# Patient Record
Sex: MALE | Race: WHITE
[De-identification: names, ages, dates, MRNs, and addresses within clinical notes are randomized per-mention and may not be internally consistent; named-entity substitution may affect disease eponyms.]

---

## 2017-12-12 ENCOUNTER — HOSPITAL ENCOUNTER (INPATIENT)
Dept: HOSPITAL 92 - SURG A | Age: 82
LOS: 10 days | Discharge: NURSING, REHAB OR LONG-TERM CARE | DRG: 468 | End: 2017-12-22
Attending: ORTHOPAEDIC SURGERY | Admitting: ORTHOPAEDIC SURGERY
Payer: MEDICARE

## 2017-12-12 ENCOUNTER — HOSPITAL ENCOUNTER (OUTPATIENT)
Dept: HOSPITAL 92 - LABBT | Age: 82
Discharge: HOME | End: 2017-12-12
Attending: ORTHOPAEDIC SURGERY
Payer: MEDICARE

## 2017-12-12 VITALS — BODY MASS INDEX: 35.9 KG/M2

## 2017-12-12 DIAGNOSIS — I10: ICD-10-CM

## 2017-12-12 DIAGNOSIS — E78.5: ICD-10-CM

## 2017-12-12 DIAGNOSIS — I49.9: ICD-10-CM

## 2017-12-12 DIAGNOSIS — K21.9: ICD-10-CM

## 2017-12-12 DIAGNOSIS — Z90.49: ICD-10-CM

## 2017-12-12 DIAGNOSIS — Z85.828: ICD-10-CM

## 2017-12-12 DIAGNOSIS — Z95.0: ICD-10-CM

## 2017-12-12 DIAGNOSIS — Z01.818: Primary | ICD-10-CM

## 2017-12-12 DIAGNOSIS — T84.59XA: ICD-10-CM

## 2017-12-12 DIAGNOSIS — K27.9: ICD-10-CM

## 2017-12-12 DIAGNOSIS — S83.005A: ICD-10-CM

## 2017-12-12 DIAGNOSIS — Z96.651: ICD-10-CM

## 2017-12-12 DIAGNOSIS — Z98.42: ICD-10-CM

## 2017-12-12 DIAGNOSIS — Z98.41: ICD-10-CM

## 2017-12-12 DIAGNOSIS — T84.54XA: Primary | ICD-10-CM

## 2017-12-12 DIAGNOSIS — T84.093A: ICD-10-CM

## 2017-12-12 DIAGNOSIS — E11.618: ICD-10-CM

## 2017-12-12 DIAGNOSIS — N40.0: ICD-10-CM

## 2017-12-12 DIAGNOSIS — G47.33: ICD-10-CM

## 2017-12-12 DIAGNOSIS — Z86.718: ICD-10-CM

## 2017-12-12 LAB
ANION GAP SERPL CALC-SCNC: 14 MMOL/L (ref 10–20)
APTT PPP: 33.3 SEC (ref 22.9–36.1)
BACTERIA UR QL AUTO: (no result) HPF
BASOPHILS # BLD AUTO: 0.1 THOU/UL (ref 0–0.2)
BASOPHILS NFR BLD AUTO: 0.9 % (ref 0–1)
BUN SERPL-MCNC: 25 MG/DL (ref 8.4–25.7)
CALCIUM SERPL-MCNC: 9.4 MG/DL (ref 7.8–10.44)
CHLORIDE SERPL-SCNC: 110 MMOL/L (ref 98–107)
CO2 SERPL-SCNC: 22 MMOL/L (ref 23–31)
CREAT CL PREDICTED SERPL C-G-VRATE: 0 ML/MIN (ref 70–130)
EOSINOPHIL # BLD AUTO: 0.6 THOU/UL (ref 0–0.7)
EOSINOPHIL NFR BLD AUTO: 6.1 % (ref 0–10)
HCT VFR BLD CALC: 40.9 % (ref 42–52)
HYALINE CASTS #/AREA URNS LPF: (no result) LPF
LYMPHOCYTES # BLD: 2.9 THOU/UL (ref 1.2–3.4)
LYMPHOCYTES NFR BLD AUTO: 28 % (ref 21–51)
MONOCYTES # BLD AUTO: 1.1 THOU/UL (ref 0.11–0.59)
MONOCYTES NFR BLD AUTO: 10.4 % (ref 0–10)
NEUTROPHILS # BLD AUTO: 5.7 THOU/UL (ref 1.4–6.5)
PROTHROMBIN TIME: 14.4 SEC (ref 12–14.7)
RBC # BLD AUTO: 4.48 MILL/UL (ref 4.7–6.1)
WBC # BLD AUTO: 10.5 THOU/UL (ref 4.8–10.8)

## 2017-12-12 PROCEDURE — 85027 COMPLETE CBC AUTOMATED: CPT

## 2017-12-12 PROCEDURE — 82550 ASSAY OF CK (CPK): CPT

## 2017-12-12 PROCEDURE — 85025 COMPLETE CBC W/AUTO DIFF WBC: CPT

## 2017-12-12 PROCEDURE — 89060 EXAM SYNOVIAL FLUID CRYSTALS: CPT

## 2017-12-12 PROCEDURE — 87081 CULTURE SCREEN ONLY: CPT

## 2017-12-12 PROCEDURE — 85730 THROMBOPLASTIN TIME PARTIAL: CPT

## 2017-12-12 PROCEDURE — 80048 BASIC METABOLIC PNL TOTAL CA: CPT

## 2017-12-12 PROCEDURE — 86901 BLOOD TYPING SEROLOGIC RH(D): CPT

## 2017-12-12 PROCEDURE — 87070 CULTURE OTHR SPECIMN AEROBIC: CPT

## 2017-12-12 PROCEDURE — C1751 CATH, INF, PER/CENT/MIDLINE: HCPCS

## 2017-12-12 PROCEDURE — 36415 COLL VENOUS BLD VENIPUNCTURE: CPT

## 2017-12-12 PROCEDURE — 87205 SMEAR GRAM STAIN: CPT

## 2017-12-12 PROCEDURE — 83735 ASSAY OF MAGNESIUM: CPT

## 2017-12-12 PROCEDURE — 86140 C-REACTIVE PROTEIN: CPT

## 2017-12-12 PROCEDURE — C1776 JOINT DEVICE (IMPLANTABLE): HCPCS

## 2017-12-12 PROCEDURE — 88305 TISSUE EXAM BY PATHOLOGIST: CPT

## 2017-12-12 PROCEDURE — 36416 COLLJ CAPILLARY BLOOD SPEC: CPT

## 2017-12-12 PROCEDURE — 85610 PROTHROMBIN TIME: CPT

## 2017-12-12 PROCEDURE — C1713 ANCHOR/SCREW BN/BN,TIS/BN: HCPCS

## 2017-12-12 PROCEDURE — 86850 RBC ANTIBODY SCREEN: CPT

## 2017-12-12 PROCEDURE — S0020 INJECTION, BUPIVICAINE HYDRO: HCPCS

## 2017-12-12 PROCEDURE — 81001 URINALYSIS AUTO W/SCOPE: CPT

## 2017-12-12 PROCEDURE — 87077 CULTURE AEROBIC IDENTIFY: CPT

## 2017-12-12 PROCEDURE — 36569 INSJ PICC 5 YR+ W/O IMAGING: CPT

## 2017-12-12 PROCEDURE — 71020: CPT

## 2017-12-12 PROCEDURE — 86900 BLOOD TYPING SEROLOGIC ABO: CPT

## 2017-12-12 PROCEDURE — 80202 ASSAY OF VANCOMYCIN: CPT

## 2017-12-12 NOTE — RAD
HISTORY: 

Preoperative chest radiograph

 

TWO VIEWS CHEST:

12/12/17

 

PA and lateral views of the chest is obtained on 12/12/17.

 

Comparison made to previous exam of 6/5/06.

 

Two views chest demonstrates a dual lead intracardiac pacing device. Cardiomegaly is seen. Calcificat
ion of the aorta is noted. No evidence of effusions, pneumonia, or pneumothorax seen.

 

IMPRESSION:  

Cardiomegaly.

 

 

 

POS: [facility]

## 2017-12-15 PROCEDURE — 0SHD08Z INSERTION OF SPACER INTO LEFT KNEE JOINT, OPEN APPROACH: ICD-10-PCS | Performed by: ORTHOPAEDIC SURGERY

## 2017-12-15 PROCEDURE — 0SBD0ZZ EXCISION OF LEFT KNEE JOINT, OPEN APPROACH: ICD-10-PCS | Performed by: ORTHOPAEDIC SURGERY

## 2017-12-15 PROCEDURE — 0SWD0JZ REVISION OF SYNTHETIC SUBSTITUTE IN LEFT KNEE JOINT, OPEN APPROACH: ICD-10-PCS | Performed by: ORTHOPAEDIC SURGERY

## 2017-12-15 RX ADMIN — DOCUSATE SODIUM 50 MG AND SENNOSIDES 8.6 MG SCH TAB: 8.6; 5 TABLET, FILM COATED ORAL at 21:40

## 2017-12-15 RX ADMIN — VANCOMYCIN HYDROCHLORIDE SCH MLS: 1 INJECTION, POWDER, LYOPHILIZED, FOR SOLUTION INTRAVENOUS at 21:41

## 2017-12-15 RX ADMIN — AZELASTINE HYDROCHLORIDE SCH SPR: 137 SPRAY, METERED NASAL at 21:42

## 2017-12-15 RX ADMIN — CEFTRIAXONE SCH MLS: 2 INJECTION, POWDER, FOR SOLUTION INTRAMUSCULAR; INTRAVENOUS at 15:43

## 2017-12-16 LAB
ANION GAP SERPL CALC-SCNC: 8 MMOL/L (ref 10–20)
BUN SERPL-MCNC: 21 MG/DL (ref 8.4–25.7)
CALCIUM SERPL-MCNC: 8 MG/DL (ref 7.8–10.44)
CHLORIDE SERPL-SCNC: 111 MMOL/L (ref 98–107)
CO2 SERPL-SCNC: 25 MMOL/L (ref 23–31)
CREAT CL PREDICTED SERPL C-G-VRATE: 85 ML/MIN (ref 70–130)
GLUCOSE SERPL-MCNC: 237 MG/DL (ref 83–110)
HGB BLD-MCNC: 11 G/DL (ref 14–18)
MAGNESIUM SERPL-MCNC: 1.7 MG/DL (ref 1.6–2.6)
MCH RBC QN AUTO: 29.8 PG (ref 27–31)
MCV RBC AUTO: 92.4 FL (ref 80–94)
PLATELET # BLD AUTO: 118 THOU/UL (ref 130–400)
POTASSIUM SERPL-SCNC: 4.3 MMOL/L (ref 3.5–5.1)
RBC # BLD AUTO: 3.68 MILL/UL (ref 4.7–6.1)
SODIUM SERPL-SCNC: 140 MMOL/L (ref 136–145)
VANCOMYCIN TROUGH SERPL-MCNC: 17.8 UG/ML
WBC # BLD AUTO: 11.7 THOU/UL (ref 4.8–10.8)

## 2017-12-16 RX ADMIN — VANCOMYCIN HYDROCHLORIDE SCH MLS: 1 INJECTION, POWDER, LYOPHILIZED, FOR SOLUTION INTRAVENOUS at 21:03

## 2017-12-16 RX ADMIN — AZELASTINE HYDROCHLORIDE SCH SPR: 137 SPRAY, METERED NASAL at 20:31

## 2017-12-16 RX ADMIN — DOCUSATE SODIUM 50 MG AND SENNOSIDES 8.6 MG SCH TAB: 8.6; 5 TABLET, FILM COATED ORAL at 08:42

## 2017-12-16 RX ADMIN — ALOGLIPTIN SCH MG: 25 TABLET, FILM COATED ORAL at 08:42

## 2017-12-16 RX ADMIN — CEFTRIAXONE SCH MLS: 2 INJECTION, POWDER, FOR SOLUTION INTRAMUSCULAR; INTRAVENOUS at 12:37

## 2017-12-16 RX ADMIN — DOCUSATE SODIUM 50 MG AND SENNOSIDES 8.6 MG SCH TAB: 8.6; 5 TABLET, FILM COATED ORAL at 20:30

## 2017-12-16 RX ADMIN — ASPIRIN SCH MG: 81 TABLET ORAL at 08:43

## 2017-12-16 RX ADMIN — HYDROCODONE BITARTRATE AND ACETAMINOPHEN PRN TAB: 10; 325 TABLET ORAL at 17:03

## 2017-12-16 RX ADMIN — MULTIPLE VITAMINS W/ MINERALS TAB SCH TAB: TAB at 08:43

## 2017-12-16 RX ADMIN — VANCOMYCIN HYDROCHLORIDE SCH MLS: 1 INJECTION, POWDER, LYOPHILIZED, FOR SOLUTION INTRAVENOUS at 08:38

## 2017-12-16 RX ADMIN — AZELASTINE HYDROCHLORIDE SCH SPR: 137 SPRAY, METERED NASAL at 12:39

## 2017-12-16 RX ADMIN — Medication SCH TAB: at 08:43

## 2017-12-16 NOTE — CON
DATE OF CONSULTATION:  12/16/2017

 

REASON FOR CONSULTATION:  Left total knee replacement infection.

 

HISTORY OF PRESENT ILLNESS:  An 82-year-old with history of type 2 diabetes, Farnk's esophagus, atr
ial fibrillation, sick sinus syndrome, pacemaker, and a prior left TKR implant, who developed progres
sively worsening pain in the left implant site, and eventually was evaluated by Dr. Roberts and had re
moval of the left implant and had a functional spacer placed yesterday.  The patient currently denies
 any headaches, visual symptoms, sore throat, odynophagia, dysphagia.  No cough or sputum production 
or chest pain.  Moderate pain in the left side.  No back pain.  No other joint symptoms.

 

PAST MEDICAL HISTORY:  Prior GI bleed, type 2 diabetes, Frank's esophagus, atrial fibrillation, DVT
, UTI in the past secondary to vancomycin-resistant Enterococcus.

 

PAST SURGICAL HISTORY:  Includes appendectomy, tonsillectomy, left knee replacement, and now the jacklyn
cele of the implant, wound VAC placement, duodenostomy.

 

ALLERGIES:  None.

 

FAMILY HISTORY:  Noncontributory.

 

SOCIAL HISTORY:  He used to work for [employer], he is retired, lives in [place], .  Never a smo
ker.

 

CURRENT MEDICATIONS:  Alogliptin, Norco, Ecotrin, azelastine, ceftriaxone, fentanyl, insulin, levothy
roxine, Zofran, Phenergan, vancomycin, Ambien.

 

PHYSICAL EXAMINATION:

VITAL SIGNS:  The patient has been afebrile throughout the hospital stay.  Blood pressure 119/69, pul
se 62, respirations 18, O2 sat 94%.

SKIN EXAM:  The patient has a left implant site with dressing, which was not removed.  Peripheral IV 
access.  No Goins catheter.  No lymphadenopathy.

HEENT:  Ocular movements are conjugate.  Oral cavity still with quite a few teeth in place with some 
decay.  No gum disease.

NECK:  Supple, no jugular venous distention or carotid bruits.

LUNGS:  Clear to auscultation and percussion.

HEART:  S1, S2, regular rate.  No S3 or S4.

ABDOMEN:  Soft, not distended, or tender.  No ascites.  No bladder distention.

EXTREMITIES:  No other joint inflammatory activity noted.  Pulses are 1+ in dorsalis pedis.  Plantar 
responses are flexor.  No clonus.

NEUROLOGIC:  Cognitive function appears to be intact.

 

LABORATORY DATA:  White cell count 11.7, hemoglobin 11, MCV 92, platelets 119, and sodium 140, creati
nine 1.25, which is a little bit higher than his baseline in 04/2016 when it was 0.98.  Microbiology 
with gram-positive cocci in clusters identified in one sample, and the other sample from the knee, th
ere is early growth, but not yet identified.  Pathology of surgical specimen pending.

 

ASSESSMENT:  Type 2 diabetes with prior implants in left knee with infection, status post removal of 
the implant and placement of a functional spacer.  Waiting on culture results.

 

DISCUSSION:  Patient will have a PICC line inserted and plan treatment for 42 days with antimicrobial
 to be decided once we have the final identification of the organism.  Since this is a functional imp
lant and will remain in place, plan to continue treatment for 3 months with oral regimen, again to be
 decided by the susceptibility studies being performed in the organisms isolated from the sample.  PI
CC line placement and we have discussed potential adverse reactions from the PICC line as well as the
 antimicrobials including skin rash, diarrhea, kidney, liver, and bone marrow toxicity.  The patient 
and wife understood and agreed with management and recommendations.

## 2017-12-16 NOTE — PRG
DATE OF SERVICE:  12/16/2017

 

SUBJECTIVE:  Today is postop day #1 after resection and placement of a new total knee, basically a 1-
stage revision with cemented implants.  Today, he is doing quite well.  He has got excellent pain rel
ief with his block.  He has only a scant drainage today.  As ordered, a wound VAC has been placed.  M
icrobiology shows gram-positive cocci in clusters and cultures are still pending at this time.

 

ASSESSMENT:  Chronically infected left total knee.

 

PLAN:  At this time is as follows.  A wound VAC until the sinus tract closes.  IV antibiotics.  Discu
ssed with Dr. Lin.  He is on vancomycin and Zosyn.  Also, patient will be switched to a consistent 
carbohydrate diet.  Discussed with Dr. Verduzco today.  A PICC line was ordered by myself, and 2 views 
of the knee will be obtained for routine documentation purposes while in the department for his PICC 
line.

## 2017-12-16 NOTE — PDOC.PN
- Subjective


Encounter Start Date: 12/16/17


Encounter Start Time: 09:40





PT sen earlier on rounds.  No overnight problems, states he hasnt had anything 

to eat.  No F/C, no N/V/DC, no sweats or chills.  no CP or SOB, slept with his 

CPAP last night





10 point ROS performed and neg for all systems except as per HPI.





Wife at bedside, questions answers.





- Objective


Vital Signs & Weight: 


 Vital Signs (12 hours)











  Temp Pulse Resp BP Pulse Ox


 


 12/16/17 07:29  98.4 F  60  19  


 


 12/16/17 07:20  98 F  64  18  124/66  93 L


 


 12/16/17 04:00  98.4 F  60  19  109/63  92 L


 


 12/16/17 00:00  97.8 F  63  18  131/64  94 L








 Weight











Weight                         287 lb














I&O: 


 











 12/15/17 12/16/17 12/17/17





 06:59 06:59 06:59


 


Intake Total  1440 


 


Output Total  750 


 


Balance  690 











Result Diagrams: 


 12/16/17 04:59





 12/16/17 04:59


Additional Labs: 


 Accuchecks











  12/16/17 12/15/17





  05:13 20:14


 


POC Glucose  219 H  263 H














Phys Exam





- Physical Examination


Constitutional: NAD


HEENT: PERRLA, moist MMs, sclera anicteric, oral pharynx no lesions


Neck: no nodes, no JVD, supple, full ROM


Respiratory: no wheezing, no rales, no rhonchi, clear to auscultation bilateral


Cardiovascular: RRR, no significant murmur, no rub


Gastrointestinal: soft, non-tender, no distention, positive bowel sounds


Musculoskeletal: pulses present, edema present


Neurological: non-focal, normal sensation, moves all 4 limbs


Lymphatic: no nodes


Psychiatric: normal affect, A&O x 3


Skin: no rash, normal turgor, cap refill <2 seconds





Dx/Plan


(1) HTN (hypertension)


Code(s): I10 - ESSENTIAL (PRIMARY) HYPERTENSION   Status: Chronic   


Qualifiers: 


   Hypertension type: essential hypertension   Qualified Code(s): I10 - 

Essential (primary) hypertension   


Comment: pt states he had before, BP normal now, not on Rx.  CCM and monitor   





(2) PUD (peptic ulcer disease)


Code(s): K27.9 - PEPTIC ULC, SITE UNSP, UNSP AS AC OR CHR, W/O HEMOR OR PERF   

Status: Chronic   Comment: massive bleed requiring surgical oversew in past.  

GI prophylaxis   





(3) DM2 (diabetes mellitus, type 2)


Status: Chronic   


Qualifiers: 


   Diabetes mellitus complication status: without complication   Diabetes 

mellitus long term insulin use: without long term use   Qualified Code(s): 

E11.9 - Type 2 diabetes mellitus without complications   





(4) Infection of prosthetic knee joint


Code(s): T84.59XA - INFECT/INFLM REACTION DUE TO OTH INTERNAL JOINT PROSTH, INIT

; Z96.659 - PRESENCE OF UNSPECIFIED ARTIFICIAL KNEE JOINT   Status: Acute   


Qualifiers: 


   Encounter type: initial encounter   Qualified Code(s): T84.59XA - Infection 

and inflammatory reaction due to other internal joint prosthesis, initial 

encounter; Z96.659 - Presence of unspecified artificial knee joint; Z96.659 - 

Presence of unspecified artificial knee joint   


Comment: s/p 1 stage revision by Dr Roberts.  Tissue cx, fluid cx, and swab cx 

sent, all neg overnight.  Only fluid Grams Stain positive, revelaing GPC in 

clusters.  Vanc   





(5) PRISCA (obstructive sleep apnea)


Code(s): G47.33 - OBSTRUCTIVE SLEEP APNEA (ADULT) (PEDIATRIC)   Status: Chronic

   Comment: home CPAP   





(6) Knee arthropathy


Code(s): M12.9 - ARTHROPATHY, UNSPECIFIED   Status: Acute   





- Plan


cont current plan of care, plan discussed w/ family, continue antibiotics, PT/OT

, out of bed/ambulate





* .

## 2017-12-16 NOTE — PRG
DATE OF SERVICE:  12/16/2017

 

SUBJECTIVE:  Coy is an 82-year-old male, who is postoperative day #1 from a single-stage revision
 left total knee arthroplasty due to infection.  He is doing relatively well.  He has very little in 
the way of complaints.  No definitive cultures have returned yet.  There is a question of gram-positi
ve cocci on microscopy and Gram stain.  He has been relatively stable overnight.

 

OBJECTIVE:

VITAL SIGNS:  Temperature 98.4, pulse 60, respiratory rate is 19, blood pressure is 124/66.

GENERAL:  He is alert and oriented to person, place, time, and situation, and appropriate and respons
annel to examiner.  Pain appears to be under good control.

EXTREMITIES:  He is neurovascularly intact in left lower extremity.  No strike through is noted.

 

LABORATORY EVALUATION:  White blood cell count was 11.7, hemoglobin 11.0, hematocrit 34.0.  Cultures 
are still pending definitively.

 

ASSESSMENT:  An 82-year-old male postoperative day revision single-stage left total knee arthroplasty
 due to an infection.

 

PLAN:

1.  Continue current management.

2.  Dr. Lin' evaluation is pending.

3.  Wound care will see the patient later this afternoon for a VAC placement.

4.  Follow cultures, treat appropriately.

## 2017-12-16 NOTE — CON
DATE OF CONSULTATION:  12/15/2017

 

INITIAL INPATIENT CONSULT NOTE

 

TIME OF VISIT:  1500 hours.

 

REQUESTING PHYSICIAN:  Dyllan Roberts M.D.

 

REASON FOR CONSULTATION:  Medical management status post revision of left total knee arthroplasty due
 to infection.

 

Patient has multiple comorbidities including sleep apnea, hypertension, atrial arrhythmias, diabetes,
 GERD, and BPH.

 

Patient had a knee replacement initially back in 04/2014.  He has had issues with skin breakdown and 
drainage from the same knee and conservative management.  He is also referred to Dr. Roberts, and was 
noted to have a skin breakdown and drainage from the wound.

 

Cultures were not obtained prior to admission.  The patient was admitted postoperative from left knee
 hardware removal and replacement and stage I procedure and cultures were sent from the fluid, tissue
, and swab at the time of surgery.

 

Postoperatively, he is doing well.  Denies any fevers or chills, chest pain or shortness of breath, n
o nausea or vomiting, no diarrhea or constipation.

 

PAST MEDICAL HISTORY:

1.  Obstructive sleep apnea on CPAP, he did bring his own machine.

2.  Hypertension, is under control.

3.  Arrhythmias status post ablation x1.  He sees Dr. Salcedo regularly.

4.  Diabetes mellitus type 2.

5.  GERD/peptic ulcer disease with a massive GI bleed back in 2004.  He states he required 17 units o
f blood transfusion.

6.  BPH.

 

PAST SURGICAL HISTORY:  Includes bilateral cataracts, skin cancer removal, heart ablation, laparoscop
ic cholecystectomy, intraoperative cholangiogram back in 04/2016, ulnar nerve transposition in 05/201
7, TKA 04/2014.

 

HOME MEDICATIONS:

1.  Amoxicillin 2000 mg p.o. prior to dental procedures.

2.  Aspirin 81 mg daily.

3.  Azelastine fluticasone 20 mg spray 1 spray each naris daily.

4.  Calcium plus D3 1500/250 1 tablet daily.

5.  Plavix 75 mg daily.

6.  Cyanocobalamin 5000 mcg daily.

7.  Glucosamine chondroitin 2 tablets daily.

8.  Dexlansoprazole 60 mg p.o. at bedtime.

9.  Proscar 5 mg p.o. daily.

10.  Levothyroxine 75 mcg daily.

11.  Centrum Silver 1 tablet daily.

12.  Oxybutynin 2 tablets p.o. q.p.m.

13.  Crestor 20 mg p.o. at bedtime.

14.  Januvia 50 mg p.o. q.a.m.

15.  Flomax 0.4 mg p.o. at bedtime.

 

ALLERGIES:  NKDA.

 

FAMILY HISTORY:  Significant for diabetes, hypertension, and strokes.

 

SOCIAL HISTORY:  Negative for habits x3.  He is .  His wife accompanies him.

 

REVIEW OF SYSTEMS:  A 10-point review of systems was performed and was negative for all other systems
 except as stated per HPI.

 

PHYSICAL EXAMINATION:

VITAL SIGNS:  Temperature current 97.5, pulse 68, blood pressure 110/63, respiratory rate 20, satting
 93% on 2 liters nasal cannula.

GENERAL:  He is awake.  He is alert.  He is oriented x3.  He is an obese white male, appears to be in
 no acute distress.

HEENT:  Normocephalic, atraumatic.  Pupils are equal and reactive bilaterally.  Mucous membranes are 
moist.  There is no visible lesion and no thrush.

NECK:  Supple, without lymphadenopathy, JVD or thyromegaly.

LUNGS:  Clear to auscultation bilaterally without wheezes, rales or rhonchi.  He has no prolonged exp
iratory phase.  He has good air movement.  Symmetrical chest excursion.

CARDIOVASCULAR:  Normal S1 and S2.  No S3 or S4.  He has a regular rhythm with a normal rate.  He has
 no murmurs.

ABDOMEN:  Obese, it is nontender, and nondistended.  He has good bowel sounds present.  There is no r
ebound, rigidity or guarding.

EXTREMITIES:  There is no cyanosis or clubbing.  There is trace bilateral lower extremity edema, more
 than the left than the right.  He has a postop dressing present over his left knee and that is clean
, dry, and intact.

SKIN:  Otherwise, warm, moist, and well perfused.  He has no other rashes and no lesions.

NEUROLOGIC:  Cranial nerves II-XII grossly intact.  He has no focal deficits, normal speech, and 5/5 
strength.

MUSCULOSKELETAL:  Other than that left knee shows joints to be normal to inspection.  He has no infla
mmation or redness and no palpable effusions.

 

LABORATORY DATA:  On 12/12/2017 he had a basic metabolic profile that was normal.  Creatinine 1.21, p
otassium 4.5, glucose 146 and calcium 9.4.  Urinalysis that did show greater than 50 white blood cell
s, 1+ bacteria and moderate leukocyte esterase.  INR 1.1.  CBC showed white count of 10.5, hemoglobin
 13.1, hematocrit of 40.7 and platelets 153,000.

 

MICROBIOLOGIC DATA:  He had knee tissue culture sent with Gram stain that showed white cells and no o
rganisms, fluid showed rare gram positive cocci in clusters and white blood cells, swab showed no whi
te blood cells, no bacteria.

 

Cultures have been sent.

 

ASSESSMENT AND PLAN:

1.  Infected left total knee arthroplasty, late onset and patient had symptoms longer than 2 weeks.  
The patient had a 1 staged procedure as he was not a good candidate for two stage.  This does tend to
 be truly infected, at least 6-8 weeks of IV antibiotics.  C-reactive protein is just over 1, sed rat
e was not done and we will add.  Would treat for a minimum of 6 weeks.  If sed rate and CRP are henny
l by then and then convert to p.o. antibiotics, otherwise continue IV antibiotics to complete an 8-we
ek course before switching to p.o.

2.  After IV is complete, we will treat for 3-6 months with oral antibiotics depending on the identif
ication and susceptibilities.

3.  Status post 1 stage revision.  Postop care per Orthopedics including deep venous thrombosis proph
ylaxis.

4.  History of obstructive sleep apnea:  Continue home continuous positive airway pressure.

5.  History of hypertension:  The patient states it is controlled and off medications.  We will monit
or here in the hospital.

6.  History of atrial arrhythmia followed by Dr. Salcedo.  His preop workup revealed nothing abnormal.


7.  Diabetes mellitus type 2, glucose 146 on prescreening labs:  We will watch his sugars here.

8.  Gastroesophageal reflux disease/peptic ulcer disease.  We will continue his dexlansoprazole for g
astrointestinal prophylaxis.

9.  History of BPH on Proscar and Flomax, which we will continue.

10.  Hyperlipidemia on Crestor, which can continue.

11.  Long term monitoring of antibiotics:  If he again does start to have an infection, will need gwen
g-term IV antibiotics.  We will need weekly CBC, CMP, ESR, and CRP and vancomycin if it turns out to 
need vancomycin for treatment.

## 2017-12-17 LAB
HGB BLD-MCNC: 11 G/DL (ref 14–18)
MCH RBC QN AUTO: 30.1 PG (ref 27–31)
MCV RBC AUTO: 93.1 FL (ref 80–94)
PLATELET # BLD AUTO: 103 THOU/UL (ref 130–400)
RBC # BLD AUTO: 3.64 MILL/UL (ref 4.7–6.1)
WBC # BLD AUTO: 12.6 THOU/UL (ref 4.8–10.8)

## 2017-12-17 RX ADMIN — VANCOMYCIN HYDROCHLORIDE SCH MLS: 1 INJECTION, POWDER, LYOPHILIZED, FOR SOLUTION INTRAVENOUS at 08:58

## 2017-12-17 RX ADMIN — DOCUSATE SODIUM 50 MG AND SENNOSIDES 8.6 MG SCH TAB: 8.6; 5 TABLET, FILM COATED ORAL at 09:03

## 2017-12-17 RX ADMIN — Medication PRN ML: at 21:03

## 2017-12-17 RX ADMIN — INSULIN LISPRO PRN UNIT: 100 INJECTION, SOLUTION INTRAVENOUS; SUBCUTANEOUS at 21:25

## 2017-12-17 RX ADMIN — Medication SCH TAB: at 09:02

## 2017-12-17 RX ADMIN — VANCOMYCIN HYDROCHLORIDE SCH MLS: 1 INJECTION, POWDER, LYOPHILIZED, FOR SOLUTION INTRAVENOUS at 21:02

## 2017-12-17 RX ADMIN — AZELASTINE HYDROCHLORIDE SCH SPR: 137 SPRAY, METERED NASAL at 09:02

## 2017-12-17 RX ADMIN — INSULIN LISPRO PRN UNIT: 100 INJECTION, SOLUTION INTRAVENOUS; SUBCUTANEOUS at 12:59

## 2017-12-17 RX ADMIN — HYDROCODONE BITARTRATE AND ACETAMINOPHEN PRN TAB: 10; 325 TABLET ORAL at 12:57

## 2017-12-17 RX ADMIN — ASPIRIN SCH MG: 81 TABLET ORAL at 09:02

## 2017-12-17 RX ADMIN — MULTIPLE VITAMINS W/ MINERALS TAB SCH TAB: TAB at 09:03

## 2017-12-17 RX ADMIN — AZELASTINE HYDROCHLORIDE SCH SPR: 137 SPRAY, METERED NASAL at 21:02

## 2017-12-17 RX ADMIN — DOCUSATE SODIUM 50 MG AND SENNOSIDES 8.6 MG SCH TAB: 8.6; 5 TABLET, FILM COATED ORAL at 21:03

## 2017-12-17 RX ADMIN — CEFTRIAXONE SCH MLS: 2 INJECTION, POWDER, FOR SOLUTION INTRAMUSCULAR; INTRAVENOUS at 12:09

## 2017-12-17 RX ADMIN — ALOGLIPTIN SCH MG: 25 TABLET, FILM COATED ORAL at 09:00

## 2017-12-17 RX ADMIN — INSULIN LISPRO PRN UNIT: 100 INJECTION, SOLUTION INTRAVENOUS; SUBCUTANEOUS at 16:44

## 2017-12-17 NOTE — PDOC.PN
- Subjective


Encounter Start Date: 12/17/17


Encounter Start Time: 09:40





Pt getting up with PT.  no acute overnight events, no new compalints.





Deneis F/C, no N/V/D/C, no CP or sOB.  





glucoses up, refused SSI with Humalog.





seen by ID.  plan reviewed.  Culture in lab with early growth yesterday 

afternoon





10 point ROs performed and neg for all systems except as per HPI





- Objective


MAR Reviewed: Yes


Vital Signs & Weight: 


 Vital Signs (12 hours)











  Temp Pulse Resp BP Pulse Ox


 


 12/17/17 08:00  98.4 F  63  18  133/69  92 L


 


 12/17/17 07:45  98.4 F  63  18  


 


 12/17/17 03:43  98.1 F  58 L  16  148/83 H  94 L


 


 12/16/17 23:49  99.4 F  56 L  20  119/73  98








 Weight











Admit Weight                   287 lb


 


Weight                         287 lb














I&O: 


 











 12/16/17 12/17/17 12/18/17





 06:59 06:59 06:59


 


Intake Total 1440 600 240


 


Output Total 750 800 


 


Balance 690 -200 240











Result Diagrams: 


 12/17/17 04:04





 12/16/17 04:59


Additional Labs: 


 Accuchecks











  12/17/17 12/16/17 12/16/17





  05:33 20:38 15:42


 


POC Glucose  171 H  200 H  198 H














  12/16/17





  12:08


 


POC Glucose  199 H











Radiology Reviewed by me: No


EKG Reviewed by me: No





Phys Exam





- Physical Examination


Constitutional: NAD


HEENT: PERRLA, moist MMs, sclera anicteric, oral pharynx no lesions


Neck: no nodes, no JVD, supple, full ROM


Respiratory: no wheezing, no rales, no rhonchi, clear to auscultation bilateral


Cardiovascular: RRR, no significant murmur, no rub


Gastrointestinal: soft, non-tender, no distention, positive bowel sounds


Musculoskeletal: pulses present, edema present


Neurological: non-focal, normal sensation, moves all 4 limbs


left leg in long splint.


Lymphatic: no nodes


Psychiatric: normal affect, A&O x 3


Skin: no rash, normal turgor, cap refill <2 seconds





Dx/Plan


(1) HTN (hypertension)


Code(s): I10 - ESSENTIAL (PRIMARY) HYPERTENSION   Status: Chronic   


Qualifiers: 


   Hypertension type: essential hypertension   Qualified Code(s): I10 - 

Essential (primary) hypertension   


Comment: pt states he had before, BP normal now, not on Rx.  CCM and monitor   





(2) PUD (peptic ulcer disease)


Code(s): K27.9 - PEPTIC ULC, SITE UNSP, UNSP AS AC OR CHR, W/O HEMOR OR PERF   

Status: Chronic   Comment: massive bleed requiring surgical oversew in past.  

GI prophylaxis   





(3) DM2 (diabetes mellitus, type 2)


Status: Chronic   


Qualifiers: 


   Diabetes mellitus complication status: without complication   Diabetes 

mellitus long term insulin use: without long term use   Qualified Code(s): 

E11.9 - Type 2 diabetes mellitus without complications   


Comment: SSI ordered, pt refusing.  noted.   





(4) Infection of prosthetic knee joint


Code(s): T84.59XA - INFECT/INFLM REACTION DUE TO OTH INTERNAL JOINT PROSTH, INIT

; Z96.659 - PRESENCE OF UNSPECIFIED ARTIFICIAL KNEE JOINT   Status: Acute   


Qualifiers: 


   Encounter type: initial encounter   Qualified Code(s): T84.59XA - Infection 

and inflammatory reaction due to other internal joint prosthesis, initial 

encounter; Z96.659 - Presence of unspecified artificial knee joint; Z96.659 - 

Presence of unspecified artificial knee joint   


Comment: s/p 1 stage revision by Dr Roberts.  Tissue cx, fluid cx, and swab cx 

sent, one with early growth per Dr Lin.  Only fluid Gram Stain positive, 

revealing GPC in clusters.  Vanc   





(5) PRISCA (obstructive sleep apnea)


Code(s): G47.33 - OBSTRUCTIVE SLEEP APNEA (ADULT) (PEDIATRIC)   Status: Chronic

   Comment: home CPAP   





(6) Knee arthropathy


Code(s): M12.9 - ARTHROPATHY, UNSPECIFIED   Status: Acute   





- Plan


cont current plan of care, plan discussed w/ family, continue antibiotics, PT/OT

, out of bed/ambulate





* .


tissue cx with broth growth, subculture in process, fluid culture iwth rare 

growth, subbed out

## 2017-12-18 LAB
HGB BLD-MCNC: 10.8 G/DL (ref 14–18)
MCH RBC QN AUTO: 29.8 PG (ref 27–31)
MCV RBC AUTO: 92 FL (ref 80–94)
PLATELET # BLD AUTO: 107 THOU/UL (ref 130–400)
RBC # BLD AUTO: 3.63 MILL/UL (ref 4.7–6.1)
VANCOMYCIN TROUGH SERPL-MCNC: 21.5 UG/ML
WBC # BLD AUTO: 11.1 THOU/UL (ref 4.8–10.8)

## 2017-12-18 PROCEDURE — 3E0T3BZ INTRODUCTION OF ANESTHETIC AGENT INTO PERIPHERAL NERVES AND PLEXI, PERCUTANEOUS APPROACH: ICD-10-PCS | Performed by: ANESTHESIOLOGY

## 2017-12-18 PROCEDURE — 02HV33Z INSERTION OF INFUSION DEVICE INTO SUPERIOR VENA CAVA, PERCUTANEOUS APPROACH: ICD-10-PCS | Performed by: RADIOLOGY

## 2017-12-18 RX ADMIN — INSULIN LISPRO PRN UNIT: 100 INJECTION, SOLUTION INTRAVENOUS; SUBCUTANEOUS at 07:57

## 2017-12-18 RX ADMIN — VANCOMYCIN HYDROCHLORIDE SCH MLS: 1 INJECTION, POWDER, LYOPHILIZED, FOR SOLUTION INTRAVENOUS at 08:12

## 2017-12-18 RX ADMIN — AZELASTINE HYDROCHLORIDE SCH: 137 SPRAY, METERED NASAL at 20:23

## 2017-12-18 RX ADMIN — ALOGLIPTIN SCH MG: 25 TABLET, FILM COATED ORAL at 07:57

## 2017-12-18 RX ADMIN — DOCUSATE SODIUM 50 MG AND SENNOSIDES 8.6 MG SCH: 8.6; 5 TABLET, FILM COATED ORAL at 23:01

## 2017-12-18 RX ADMIN — MULTIPLE VITAMINS W/ MINERALS TAB SCH TAB: TAB at 07:59

## 2017-12-18 RX ADMIN — HYDROCODONE BITARTRATE AND ACETAMINOPHEN PRN TAB: 10; 325 TABLET ORAL at 05:39

## 2017-12-18 RX ADMIN — CEFTRIAXONE SCH MLS: 2 INJECTION, POWDER, FOR SOLUTION INTRAMUSCULAR; INTRAVENOUS at 16:18

## 2017-12-18 RX ADMIN — Medication PRN ML: at 10:45

## 2017-12-18 RX ADMIN — VANCOMYCIN HYDROCHLORIDE SCH MLS: 1 INJECTION, SOLUTION INTRAVENOUS at 22:55

## 2017-12-18 RX ADMIN — AZELASTINE HYDROCHLORIDE SCH SPR: 137 SPRAY, METERED NASAL at 07:59

## 2017-12-18 RX ADMIN — VANCOMYCIN HYDROCHLORIDE SCH: 1 INJECTION, POWDER, LYOPHILIZED, FOR SOLUTION INTRAVENOUS at 09:47

## 2017-12-18 RX ADMIN — VANCOMYCIN HYDROCHLORIDE SCH MLS: 1 INJECTION, SOLUTION INTRAVENOUS at 10:44

## 2017-12-18 RX ADMIN — DOCUSATE SODIUM 50 MG AND SENNOSIDES 8.6 MG SCH TAB: 8.6; 5 TABLET, FILM COATED ORAL at 07:58

## 2017-12-18 RX ADMIN — Medication SCH TAB: at 07:58

## 2017-12-18 RX ADMIN — ASPIRIN SCH MG: 81 TABLET ORAL at 07:58

## 2017-12-18 NOTE — RAD
LEFT KNEE TWO VIEWS:

 

History: Left knee replacement. 

 

FINDINGS: 

Post-operative changes are present including a metallic femoral component and lucent tibial component
. Joint space is preserved. Soft tissue gas, arterial calcification, and heterotopic calcification ar
e apparent. 

 

IMPRESSION: 

1. Left knee prosthesis is in good radiographic position. 

 

POS: [facility]

## 2017-12-18 NOTE — PDOC.PN
- Subjective


Encounter Start Date: 12/18/17


Encounter Start Time: 08:50





no events, PICC scheduled for today.  awaiting culture results.  [facility] 

[facility]  ? today





no F/C, no N/V/D/C, no CP or SOB.  Still refusing SSI





10 point ROS performed and neg for all systems except as per HPI





- Objective


MAR Reviewed: Yes


Vital Signs & Weight: 


 Vital Signs (12 hours)











  Temp Pulse Resp BP Pulse Ox


 


 12/18/17 11:05  98.8 F  61  20  131/57 L  90 L


 


 12/18/17 07:20  98.7 F  61  12  134/69  91 L


 


 12/18/17 04:00  97.9 F  60  18  137/74  92 L








 Weight











Admit Weight                   287 lb


 


Weight                         287 lb














I&O: 


 











 12/17/17 12/18/17 12/19/17





 06:59 06:59 06:59


 


Intake Total 600 2810 


 


Output Total 800 875 


 


Balance -200 1935 











Result Diagrams: 


 12/18/17 04:32





 12/16/17 04:59


Additional Labs: 


 Accuchecks











  12/18/17 12/18/17 12/17/17





  11:02 06:18 21:06


 


POC Glucose  184 H  203 H  245 H














  12/17/17





  15:59


 


POC Glucose  215 H














Phys Exam





- Physical Examination


Constitutional: NAD


HEENT: PERRLA, moist MMs, sclera anicteric, oral pharynx no lesions


Neck: no nodes, no JVD, supple, full ROM


Respiratory: no wheezing, no rales, no rhonchi, clear to auscultation bilateral


Cardiovascular: RRR, no significant murmur, no rub


Gastrointestinal: soft, non-tender, no distention, positive bowel sounds


Musculoskeletal: pulses present, edema present


Neurological: non-focal, normal sensation, moves all 4 limbs


Lymphatic: no nodes


Psychiatric: normal affect, A&O x 3


Skin: no rash, normal turgor, cap refill <2 seconds





Dx/Plan


(1) HTN (hypertension)


Code(s): I10 - ESSENTIAL (PRIMARY) HYPERTENSION   Status: Chronic   


Qualifiers: 


   Hypertension type: essential hypertension   Qualified Code(s): I10 - 

Essential (primary) hypertension   


Comment: pt states he had before, BP normal now, not on Rx.  CCM and monitor   





(2) PUD (peptic ulcer disease)


Code(s): K27.9 - PEPTIC ULC, SITE UNSP, UNSP AS AC OR CHR, W/O HEMOR OR PERF   

Status: Chronic   Comment: massive bleed requiring surgical oversew in past.  

GI prophylaxis   





(3) DM2 (diabetes mellitus, type 2)


Status: Chronic   


Qualifiers: 


   Diabetes mellitus complication status: without complication   Diabetes 

mellitus long term insulin use: without long term use   Qualified Code(s): 

E11.9 - Type 2 diabetes mellitus without complications   


Comment: SSI ordered, pt refusing.  noted.   





(4) Infection of prosthetic knee joint


Code(s): T84.59XA - INFECT/INFLM REACTION DUE TO OTH INTERNAL JOINT PROSTH, INIT

; Z96.659 - PRESENCE OF UNSPECIFIED ARTIFICIAL KNEE JOINT   Status: Acute   


Qualifiers: 


   Encounter type: initial encounter   Qualified Code(s): T84.59XA - Infection 

and inflammatory reaction due to other internal joint prosthesis, initial 

encounter; Z96.659 - Presence of unspecified artificial knee joint; Z96.659 - 

Presence of unspecified artificial knee joint   


Comment: s/p 1 stage revision by Dr Roberts.  Tissue cx, fluid cx, and swab cx 

sent, one with growth of GPC from tissue.  Only fluid Gram Stain positive, 

revealing GPC in clusters.  Vanc   





(5) PRISCA (obstructive sleep apnea)


Code(s): G47.33 - OBSTRUCTIVE SLEEP APNEA (ADULT) (PEDIATRIC)   Status: Chronic

   Comment: home CPAP   





(6) Knee arthropathy


Code(s): M12.9 - ARTHROPATHY, UNSPECIFIED   Status: Acute   





- Plan


cont current plan of care, continue antibiotics, PT/OT, , out of 

bed/ambulate





* .

## 2017-12-18 NOTE — SPC
SONOGRAPHIC GUIDED RIGHT UPPER EXTREMITY PICC PLACEMENT:

 

History: Knee infection. Need for long-term antibiotics. 

 

FINDINGS: 

After explaining the procedure and answering all questions the right upper extremity was prepped and 
draped in the usual sterile fashion. Sterile technique, buffered local anesthesia, sonographic guidan
ce and a 22 gauge needle were used to carefully access the right brachial vein. Standard technique wa
s then used to place the tip of a 5 Fijian single lumen PICC so that the tip lies at the level of the
 right atrium. The catheter was flushed and secured externally. The patient tolerated the procedure w
ell and was returned in unchanged condition. 

 

IMPRESSION: 

Technically successful right upper extremity PICC placement. Catheter is now ready for use. 

 

POS: PEGGY

## 2017-12-18 NOTE — PRG
DATE OF SERVICE:  12/16/2017

 

SUBJECTIVE:  Having little bit of choking spells from trying to drink fluids and on almost recumbent 
position, otherwise no chest pain, no abdominal pain, voiding without difficulty.

 

OBJECTIVE:

VITAL SIGNS:  He is afebrile, blood pressure 170/75, pulse 62, respirations 20 to 22, O2 sat 92%.

RESPIRATORY:  Coughing intermittently.  Faint expiratory wheezing.

HEART:  S1, S2, regular rate.

ABDOMEN:  Soft.  Not distended.

GENITOURINARY:  No Goins catheter.

EXTREMITIES:  A 2+ edema in the left lower extremity.

 

LABORATORY DATA:  White cell count 11.1, hemoglobin 10.8, platelets 107,000.  Creatinine 1.25, potass
ium 4.3, glucose 171.  Vancomycin trough 21.5, currently on ceftriaxone and vancomycin.  Vancomycin d
ose has been adjusted to 1 gram q.12 hours.  Microbiology with gram positive cocci retrieved from the
 knee tissue yet to be fully identified, susceptibility tested.

 

ASSESSMENT AND DISCUSSION:  Type 2 diabetes with prior implant left knee with removal of the implant,
 placement of functional spacer, gram positive cocci retrieved.  Continue Rocephin and vancomycin, ad
just dosage according to the trough level of 15 to 20.  Wait on the final identification susceptibili
ty profile and eventually probably go on rifampin combination with either vancomycin or cefazolin or 
Rocephin for 42 days.  May remain with his functional spacer indefinitely or have revision depending 
on patient's and orthopedic surgeon's preference.

## 2017-12-19 LAB
HGB BLD-MCNC: 11.1 G/DL (ref 14–18)
MCH RBC QN AUTO: 29.6 PG (ref 27–31)
MCV RBC AUTO: 90.6 FL (ref 80–94)
PLATELET # BLD AUTO: 129 THOU/UL (ref 130–400)
RBC # BLD AUTO: 3.75 MILL/UL (ref 4.7–6.1)
VANCOMYCIN TROUGH SERPL-MCNC: 19 UG/ML
WBC # BLD AUTO: 13.1 THOU/UL (ref 4.8–10.8)

## 2017-12-19 RX ADMIN — Medication SCH TAB: at 09:33

## 2017-12-19 RX ADMIN — DOCUSATE SODIUM 50 MG AND SENNOSIDES 8.6 MG SCH TAB: 8.6; 5 TABLET, FILM COATED ORAL at 20:57

## 2017-12-19 RX ADMIN — INSULIN LISPRO PRN UNIT: 100 INJECTION, SOLUTION INTRAVENOUS; SUBCUTANEOUS at 12:35

## 2017-12-19 RX ADMIN — ALOGLIPTIN SCH MG: 25 TABLET, FILM COATED ORAL at 09:33

## 2017-12-19 RX ADMIN — HYDROCODONE BITARTRATE AND ACETAMINOPHEN PRN TAB: 10; 325 TABLET ORAL at 13:54

## 2017-12-19 RX ADMIN — AZELASTINE HYDROCHLORIDE SCH SPR: 137 SPRAY, METERED NASAL at 09:34

## 2017-12-19 RX ADMIN — CEFTRIAXONE SCH MLS: 2 INJECTION, POWDER, FOR SOLUTION INTRAMUSCULAR; INTRAVENOUS at 13:56

## 2017-12-19 RX ADMIN — ASPIRIN SCH MG: 81 TABLET ORAL at 09:34

## 2017-12-19 RX ADMIN — AZELASTINE HYDROCHLORIDE SCH SPR: 137 SPRAY, METERED NASAL at 20:58

## 2017-12-19 RX ADMIN — MULTIPLE VITAMINS W/ MINERALS TAB SCH TAB: TAB at 09:33

## 2017-12-19 RX ADMIN — VANCOMYCIN HYDROCHLORIDE SCH MLS: 1 INJECTION, SOLUTION INTRAVENOUS at 22:26

## 2017-12-19 RX ADMIN — VANCOMYCIN HYDROCHLORIDE SCH MLS: 1 INJECTION, SOLUTION INTRAVENOUS at 09:35

## 2017-12-19 RX ADMIN — DOCUSATE SODIUM 50 MG AND SENNOSIDES 8.6 MG SCH TAB: 8.6; 5 TABLET, FILM COATED ORAL at 09:33

## 2017-12-19 RX ADMIN — HYDROCODONE BITARTRATE AND ACETAMINOPHEN PRN TAB: 10; 325 TABLET ORAL at 09:34

## 2017-12-19 NOTE — PDOC.PN
- Subjective


Encounter Start Date: 12/19/17


Encounter Start Time: 10:10





PT in bed, has friends visiting, denies F/C,no n/V/d/C, no CP or JOSSELINE, feels 

like he is constantly getting bothered by staff





10 point NI performed and neg for all systems except as per hPI





Cx pending, ID and sensitivities, ID note reviewed.





- Objective


MAR Reviewed: Yes


Vital Signs & Weight: 


 Vital Signs (12 hours)











  Temp Pulse Resp BP Pulse Ox


 


 12/19/17 07:35  97.8 F  61  18  166/90 H  93 L


 


 12/19/17 04:00  97.6 F  87  20  148/86 H  89 L


 


 12/19/17 00:19  100.4 F H  62  20  132/87  89 L








 Weight











Admit Weight                   287 lb


 


Weight                         287 lb














I&O: 


 











 12/18/17 12/19/17 12/20/17





 06:59 06:59 06:59


 


Intake Total 2810 1636 


 


Output Total 875 400 


 


Balance 1935 1236 











Result Diagrams: 


 12/19/17 04:55





 12/16/17 04:59


Additional Labs: 


 Accuchecks











  12/19/17 12/19/17 12/18/17





  11:02 05:22 19:35


 


POC Glucose  262 H  201 H  200 H














  12/18/17 12/18/17





  16:05 11:02


 


POC Glucose  160 H  184 H











Radiology Reviewed by me: Yes





Phys Exam





- Physical Examination


Constitutional: NAD


HEENT: PERRLA, moist MMs, sclera anicteric, oral pharynx no lesions


Neck: no nodes, no JVD, supple, full ROM


Respiratory: no wheezing, no rales, no rhonchi, clear to auscultation bilateral


Cardiovascular: RRR, no significant murmur, no rub


Gastrointestinal: soft, non-tender, no distention, positive bowel sounds


Musculoskeletal: pulses present, edema present


Neurological: non-focal, normal sensation, moves all 4 limbs


Lymphatic: no nodes


Psychiatric: normal affect, A&O x 3


Skin: no rash, normal turgor, cap refill <2 seconds





Dx/Plan


(1) HTN (hypertension)


Code(s): I10 - ESSENTIAL (PRIMARY) HYPERTENSION   Status: Chronic   


Qualifiers: 


   Hypertension type: essential hypertension   Qualified Code(s): I10 - 

Essential (primary) hypertension   


Comment: pt states he had before, BP normal now, not on Rx.  CCM and monitor   





(2) PUD (peptic ulcer disease)


Code(s): K27.9 - PEPTIC ULC, SITE UNSP, UNSP AS AC OR CHR, W/O HEMOR OR PERF   

Status: Chronic   Comment: massive bleed requiring surgical oversew in past.  

GI prophylaxis   





(3) DM2 (diabetes mellitus, type 2)


Status: Chronic   


Qualifiers: 


   Diabetes mellitus complication status: without complication   Diabetes 

mellitus long term insulin use: without long term use   Qualified Code(s): 

E11.9 - Type 2 diabetes mellitus without complications   


Comment: SSI ordered, pt refusing.  noted.   





(4) Infection of prosthetic knee joint


Code(s): T84.59XA - INFECT/INFLM REACTION DUE TO OTH INTERNAL JOINT PROSTH, INIT

; Z96.659 - PRESENCE OF UNSPECIFIED ARTIFICIAL KNEE JOINT   Status: Acute   


Qualifiers: 


   Encounter type: initial encounter   Qualified Code(s): T84.59XA - Infection 

and inflammatory reaction due to other internal joint prosthesis, initial 

encounter; Z96.659 - Presence of unspecified artificial knee joint; Z96.659 - 

Presence of unspecified artificial knee joint   


Comment: s/p 1 stage revision by Dr Roberts.  Tissue cx, fluid cx, and swab cx 

sent, one with growth of GPC from tissue.  Only fluid Gram Stain positive, 

revealing GPC in clusters.  Vanc   





(5) PRISCA (obstructive sleep apnea)


Code(s): G47.33 - OBSTRUCTIVE SLEEP APNEA (ADULT) (PEDIATRIC)   Status: Chronic

   Comment: home CPAP   





(6) Knee arthropathy


Code(s): M12.9 - ARTHROPATHY, UNSPECIFIED   Status: Acute   





- Plan





* .

## 2017-12-20 LAB
HGB BLD-MCNC: 11.9 G/DL (ref 14–18)
MCH RBC QN AUTO: 29.8 PG (ref 27–31)
MCV RBC AUTO: 92.4 FL (ref 80–94)
PLATELET # BLD AUTO: 136 THOU/UL (ref 130–400)
RBC # BLD AUTO: 3.99 MILL/UL (ref 4.7–6.1)
WBC # BLD AUTO: 12.3 THOU/UL (ref 4.8–10.8)

## 2017-12-20 RX ADMIN — MULTIPLE VITAMINS W/ MINERALS TAB SCH TAB: TAB at 09:47

## 2017-12-20 RX ADMIN — DOCUSATE SODIUM 50 MG AND SENNOSIDES 8.6 MG SCH TAB: 8.6; 5 TABLET, FILM COATED ORAL at 09:47

## 2017-12-20 RX ADMIN — INSULIN LISPRO PRN UNIT: 100 INJECTION, SOLUTION INTRAVENOUS; SUBCUTANEOUS at 06:21

## 2017-12-20 RX ADMIN — VANCOMYCIN HYDROCHLORIDE SCH MLS: 1 INJECTION, SOLUTION INTRAVENOUS at 09:50

## 2017-12-20 RX ADMIN — DOCUSATE SODIUM 50 MG AND SENNOSIDES 8.6 MG SCH TAB: 8.6; 5 TABLET, FILM COATED ORAL at 20:38

## 2017-12-20 RX ADMIN — INSULIN LISPRO PRN UNIT: 100 INJECTION, SOLUTION INTRAVENOUS; SUBCUTANEOUS at 21:15

## 2017-12-20 RX ADMIN — Medication SCH TAB: at 09:47

## 2017-12-20 RX ADMIN — VANCOMYCIN HYDROCHLORIDE SCH MLS: 1 INJECTION, SOLUTION INTRAVENOUS at 22:14

## 2017-12-20 RX ADMIN — AZELASTINE HYDROCHLORIDE SCH SPR: 137 SPRAY, METERED NASAL at 09:50

## 2017-12-20 RX ADMIN — ALOGLIPTIN SCH MG: 25 TABLET, FILM COATED ORAL at 09:47

## 2017-12-20 RX ADMIN — ASPIRIN SCH MG: 81 TABLET ORAL at 09:47

## 2017-12-20 RX ADMIN — LINEZOLID SCH MLS: 600 INJECTION, SOLUTION INTRAVENOUS at 20:39

## 2017-12-20 RX ADMIN — AZELASTINE HYDROCHLORIDE SCH SPR: 137 SPRAY, METERED NASAL at 20:37

## 2017-12-20 NOTE — PDOC.PN
- Subjective


Encounter Start Date: 12/20/17


Encounter Start Time: 11:20





no events, no complaint,s no F/C, no n/V/D/c.





Cultures resulted with e faecium, sentitivities pending.  Discussed with ID - 

they wuill start zyvox in case its VRe





10 point ROs performed and neg for all systems except as per HPI





- Objective


MAR Reviewed: Yes


Vital Signs & Weight: 


 Vital Signs (12 hours)











  Temp Pulse Resp BP Pulse Ox


 


 12/20/17 11:03  98.4 F  60  18  136/71  91 L


 


 12/20/17 08:00  98.3 F  60  18  


 


 12/20/17 07:27  98.3 F  60  18  152/79 H  91 L


 


 12/20/17 04:07  98 F  62  20  150/90 H  93 L








 Weight











Admit Weight                   287 lb


 


Weight                         287 lb














I&O: 


 











 12/19/17 12/20/17 12/21/17





 06:59 06:59 06:59


 


Intake Total 1636 1840 


 


Output Total 400 500 


 


Balance 1236 1340 











Result Diagrams: 


 12/20/17 04:10





 12/16/17 04:59


Additional Labs: 


 Accuchecks











  12/20/17 12/20/17 12/19/17





  11:04 05:47 20:40


 


POC Glucose  202 H  219 H  218 H














  12/19/17





  15:28


 


POC Glucose  206 H














Phys Exam





- Physical Examination


Constitutional: NAD


HEENT: PERRLA, moist MMs, sclera anicteric, oral pharynx no lesions


Neck: no nodes, no JVD, supple, full ROM


Respiratory: no wheezing, no rales, no rhonchi, clear to auscultation bilateral


Cardiovascular: RRR, no significant murmur


Gastrointestinal: soft, non-tender, no distention, positive bowel sounds


Musculoskeletal: pulses present, edema present


Neurological: non-focal, normal sensation, moves all 4 limbs


Lymphatic: no nodes


Psychiatric: normal affect, A&O x 3


Skin: no rash, normal turgor, cap refill <2 seconds





Dx/Plan


(1) HTN (hypertension)


Code(s): I10 - ESSENTIAL (PRIMARY) HYPERTENSION   Status: Chronic   


Qualifiers: 


   Hypertension type: essential hypertension   Qualified Code(s): I10 - 

Essential (primary) hypertension   


Comment: pt states he had before, BP normal now, not on Rx.  CCM and monitor   





(2) PUD (peptic ulcer disease)


Code(s): K27.9 - PEPTIC ULC, SITE UNSP, UNSP AS AC OR CHR, W/O HEMOR OR PERF   

Status: Chronic   Comment: massive bleed requiring surgical oversew in past.  

GI prophylaxis   





(3) DM2 (diabetes mellitus, type 2)


Status: Chronic   


Qualifiers: 


   Diabetes mellitus complication status: without complication   Diabetes 

mellitus long term insulin use: without long term use   Qualified Code(s): 

E11.9 - Type 2 diabetes mellitus without complications   


Comment: SSI ordered, pt refusing.  noted.   





(4) Infection of prosthetic knee joint


Code(s): T84.59XA - INFECT/INFLM REACTION DUE TO OTH INTERNAL JOINT PROSTH, INIT

; Z96.659 - PRESENCE OF UNSPECIFIED ARTIFICIAL KNEE JOINT   Status: Acute   


Qualifiers: 


   Encounter type: initial encounter   Qualified Code(s): T84.59XA - Infection 

and inflammatory reaction due to other internal joint prosthesis, initial 

encounter; Z96.659 - Presence of unspecified artificial knee joint; Z96.659 - 

Presence of unspecified artificial knee joint   


Comment: s/p 1 stage revision by Dr Roberts.  Tissue cx, fluid cx, and swab cx 

sent, one with growth of GPC from tissue.  Only fluid Gram Stain positive, 

revealing GPC in clusters.  Vanc   





(5) PRISCA (obstructive sleep apnea)


Code(s): G47.33 - OBSTRUCTIVE SLEEP APNEA (ADULT) (PEDIATRIC)   Status: Chronic

   Comment: home CPAP   





(6) Knee arthropathy


Code(s): M12.9 - ARTHROPATHY, UNSPECIFIED   Status: Acute   





- Plan


cont current plan of care, continue antibiotics, PT/OT, out of bed/ambulate





* .


stop rocephin, starrt zyvox, follow up on cultrues per ID.  If it turns out to 

be VRE, may need to use Cubicin for the duration

## 2017-12-21 LAB
HGB BLD-MCNC: 11.1 G/DL (ref 14–18)
MCH RBC QN AUTO: 30 PG (ref 27–31)
MCV RBC AUTO: 91.2 FL (ref 80–94)
PLATELET # BLD AUTO: 133 THOU/UL (ref 130–400)
RBC # BLD AUTO: 3.71 MILL/UL (ref 4.7–6.1)
VANCOMYCIN TROUGH SERPL-MCNC: 23.2 UG/ML
WBC # BLD AUTO: 10 THOU/UL (ref 4.8–10.8)

## 2017-12-21 RX ADMIN — INSULIN LISPRO PRN UNIT: 100 INJECTION, SOLUTION INTRAVENOUS; SUBCUTANEOUS at 06:01

## 2017-12-21 RX ADMIN — Medication SCH TAB: at 10:36

## 2017-12-21 RX ADMIN — AZELASTINE HYDROCHLORIDE SCH SPR: 137 SPRAY, METERED NASAL at 21:29

## 2017-12-21 RX ADMIN — ASPIRIN SCH MG: 81 TABLET ORAL at 10:37

## 2017-12-21 RX ADMIN — DOCUSATE SODIUM 50 MG AND SENNOSIDES 8.6 MG SCH TAB: 8.6; 5 TABLET, FILM COATED ORAL at 10:36

## 2017-12-21 RX ADMIN — LINEZOLID SCH MLS: 600 INJECTION, SOLUTION INTRAVENOUS at 10:33

## 2017-12-21 RX ADMIN — DOCUSATE SODIUM 50 MG AND SENNOSIDES 8.6 MG SCH TAB: 8.6; 5 TABLET, FILM COATED ORAL at 21:30

## 2017-12-21 RX ADMIN — ALOGLIPTIN SCH MG: 25 TABLET, FILM COATED ORAL at 10:37

## 2017-12-21 RX ADMIN — INSULIN LISPRO PRN UNIT: 100 INJECTION, SOLUTION INTRAVENOUS; SUBCUTANEOUS at 21:40

## 2017-12-21 RX ADMIN — MULTIPLE VITAMINS W/ MINERALS TAB SCH TAB: TAB at 10:36

## 2017-12-21 RX ADMIN — LINEZOLID SCH MLS: 600 INJECTION, SOLUTION INTRAVENOUS at 21:33

## 2017-12-21 RX ADMIN — AZELASTINE HYDROCHLORIDE SCH SPR: 137 SPRAY, METERED NASAL at 10:48

## 2017-12-21 NOTE — PDOC.PN
- Subjective


Encounter Start Date: 12/21/17


Encounter Start Time: 09:15


-: old records requested/rev





Notes reviewed, no events, no new complaints.





awiaitng sensitivity, now all three specimens with e faecium.  tolerating Zyvox

, Vanc.





No F/c, no N/V/D/C,no CP or SOB.





10 point ROS performed and neg for all except as stated above





- Objective


MAR Reviewed: Yes


Vital Signs & Weight: 


 Vital Signs (12 hours)











  Temp Pulse Resp BP Pulse Ox


 


 12/21/17 11:51  98.1 F  60  18  146/79 H  96


 


 12/21/17 08:03  98.1 F  62  20  161/86 H  92 L


 


 12/21/17 04:57  97.7 F  64  22 H  155/88 H  92 L


 


 12/21/17 00:28  97.9 F  65  18  162/92 H  92 L








 Weight











Admit Weight                   287 lb


 


Weight                         287 lb














I&O: 


 











 12/20/17 12/21/17 12/22/17





 06:59 06:59 06:59


 


Intake Total 1840 2120 


 


Output Total 500 950 


 


Balance 1340 1170 











Result Diagrams: 


 12/21/17 04:46





 12/16/17 04:59


Additional Labs: 


 Accuchecks











  12/21/17 12/21/17 12/20/17





  11:19 05:36 21:11


 


POC Glucose  215 H  192 H  230 H














  12/20/17





  15:53


 


POC Glucose  207 H














Phys Exam





- Physical Examination


Constitutional: NAD


HEENT: PERRLA, moist MMs, oral pharynx no lesions


Neck: no JVD, supple, full ROM


Respiratory: no wheezing, no rales, no rhonchi, clear to auscultation bilateral


Cardiovascular: RRR, no significant murmur, no rub


Gastrointestinal: soft, positive bowel sounds


Musculoskeletal: no edema, pulses present


Neurological: non-focal, normal sensation, moves all 4 limbs


Lymphatic: no nodes


Psychiatric: normal affect, A&O x 3


Skin: no rash, normal turgor, cap refill <2 seconds





Dx/Plan


(1) HTN (hypertension)


Code(s): I10 - ESSENTIAL (PRIMARY) HYPERTENSION   Status: Chronic   


Qualifiers: 


   Hypertension type: essential hypertension   Qualified Code(s): I10 - 

Essential (primary) hypertension   


Comment: pt states he had before, BP normal now, not on Rx.  CCM and monitor   





(2) PUD (peptic ulcer disease)


Code(s): K27.9 - PEPTIC ULC, SITE UNSP, UNSP AS AC OR CHR, W/O HEMOR OR PERF   

Status: Chronic   Comment: massive bleed requiring surgical oversew in past.  

GI prophylaxis   





(3) DM2 (diabetes mellitus, type 2)


Status: Chronic   


Qualifiers: 


   Diabetes mellitus complication status: without complication   Diabetes 

mellitus long term insulin use: without long term use   Qualified Code(s): 

E11.9 - Type 2 diabetes mellitus without complications   


Comment: SSI ordered, pt refusing.  noted.   





(4) Infection of prosthetic knee joint


Code(s): T84.59XA - INFECT/INFLM REACTION DUE TO OTH INTERNAL JOINT PROSTH, INIT

; Z96.659 - PRESENCE OF UNSPECIFIED ARTIFICIAL KNEE JOINT   Status: Acute   


Qualifiers: 


   Encounter type: initial encounter   Qualified Code(s): T84.59XA - Infection 

and inflammatory reaction due to other internal joint prosthesis, initial 

encounter; Z96.659 - Presence of unspecified artificial knee joint; Z96.659 - 

Presence of unspecified artificial knee joint   


Comment: s/p 1 stage revision by Dr Roberts.  Tissue cx, fluid cx, and swab cx 

sent, all positive for E faecium.  [facility] sensitivites keep terminating due to 

lack of growth in the wells, attempting again today.  On Vanc and Zyvox.  may 

need to transition to Cubicin if we cannot get.  Dr Lin following.   





(5) PRISCA (obstructive sleep apnea)


Code(s): G47.33 - OBSTRUCTIVE SLEEP APNEA (ADULT) (PEDIATRIC)   Status: Chronic

   Comment: home CPAP   





(6) Knee arthropathy


Code(s): M12.9 - ARTHROPATHY, UNSPECIFIED   Status: Acute   





- Plan


cont current plan of care, continue antibiotics, PT/OT, out of bed/ambulate





* .

## 2017-12-22 ENCOUNTER — HOSPITAL ENCOUNTER (INPATIENT)
Dept: HOSPITAL 18 - NAV ACUTE | Age: 82
LOS: 42 days | Discharge: HOME HEALTH | DRG: 949 | End: 2018-02-02
Attending: INTERNAL MEDICINE | Admitting: INTERNAL MEDICINE
Payer: MEDICARE

## 2017-12-22 VITALS — BODY MASS INDEX: 34.3 KG/M2

## 2017-12-22 VITALS — DIASTOLIC BLOOD PRESSURE: 94 MMHG | SYSTOLIC BLOOD PRESSURE: 152 MMHG

## 2017-12-22 VITALS — TEMPERATURE: 97.8 F

## 2017-12-22 DIAGNOSIS — R33.8: ICD-10-CM

## 2017-12-22 DIAGNOSIS — E66.01: ICD-10-CM

## 2017-12-22 DIAGNOSIS — I50.32: ICD-10-CM

## 2017-12-22 DIAGNOSIS — Z16.21: ICD-10-CM

## 2017-12-22 DIAGNOSIS — Z86.718: ICD-10-CM

## 2017-12-22 DIAGNOSIS — R41.0: ICD-10-CM

## 2017-12-22 DIAGNOSIS — B95.2: ICD-10-CM

## 2017-12-22 DIAGNOSIS — I48.91: ICD-10-CM

## 2017-12-22 DIAGNOSIS — J44.9: ICD-10-CM

## 2017-12-22 DIAGNOSIS — F32.9: ICD-10-CM

## 2017-12-22 DIAGNOSIS — E03.9: ICD-10-CM

## 2017-12-22 DIAGNOSIS — N40.1: ICD-10-CM

## 2017-12-22 DIAGNOSIS — F41.9: ICD-10-CM

## 2017-12-22 DIAGNOSIS — T84.54XD: Primary | ICD-10-CM

## 2017-12-22 DIAGNOSIS — Z86.711: ICD-10-CM

## 2017-12-22 DIAGNOSIS — K22.70: ICD-10-CM
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
## 2017-12-22 DIAGNOSIS — E11.9: ICD-10-CM

## 2017-12-22 DIAGNOSIS — K59.00: ICD-10-CM

## 2017-12-22 DIAGNOSIS — G47.33: ICD-10-CM

## 2017-12-22 LAB
HGB BLD-MCNC: 11.8 G/DL (ref 14–18)
MCH RBC QN AUTO: 29.5 PG (ref 27–31)
MCV RBC AUTO: 90.9 FL (ref 80–94)
PLATELET # BLD AUTO: 165 THOU/UL (ref 130–400)
RBC # BLD AUTO: 4 MILL/UL (ref 4.7–6.1)
WBC # BLD AUTO: 10.9 THOU/UL (ref 4.8–10.8)

## 2017-12-22 PROCEDURE — A4216 STERILE WATER/SALINE, 10 ML: HCPCS

## 2017-12-22 PROCEDURE — 86140 C-REACTIVE PROTEIN: CPT

## 2017-12-22 PROCEDURE — C1751 CATH, INF, PER/CENT/MIDLINE: HCPCS

## 2017-12-22 PROCEDURE — 71045 X-RAY EXAM CHEST 1 VIEW: CPT

## 2017-12-22 PROCEDURE — 82550 ASSAY OF CK (CPK): CPT

## 2017-12-22 PROCEDURE — 85652 RBC SED RATE AUTOMATED: CPT

## 2017-12-22 PROCEDURE — 81001 URINALYSIS AUTO W/SCOPE: CPT

## 2017-12-22 PROCEDURE — 94640 AIRWAY INHALATION TREATMENT: CPT

## 2017-12-22 PROCEDURE — 85025 COMPLETE CBC W/AUTO DIFF WBC: CPT

## 2017-12-22 PROCEDURE — 83880 ASSAY OF NATRIURETIC PEPTIDE: CPT

## 2017-12-22 PROCEDURE — 36416 COLLJ CAPILLARY BLOOD SPEC: CPT

## 2017-12-22 PROCEDURE — 80048 BASIC METABOLIC PNL TOTAL CA: CPT

## 2017-12-22 PROCEDURE — 80053 COMPREHEN METABOLIC PANEL: CPT

## 2017-12-22 PROCEDURE — 71010: CPT

## 2017-12-22 PROCEDURE — 87086 URINE CULTURE/COLONY COUNT: CPT

## 2017-12-22 PROCEDURE — 36415 COLL VENOUS BLD VENIPUNCTURE: CPT

## 2017-12-22 RX ADMIN — DOCUSATE SODIUM 50 MG AND SENNOSIDES 8.6 MG SCH TAB: 8.6; 5 TABLET, FILM COATED ORAL at 09:17

## 2017-12-22 RX ADMIN — MULTIPLE VITAMINS W/ MINERALS TAB SCH TAB: TAB at 09:17

## 2017-12-22 RX ADMIN — ALOGLIPTIN SCH MG: 25 TABLET, FILM COATED ORAL at 09:15

## 2017-12-22 RX ADMIN — AZELASTINE HYDROCHLORIDE SCH: 137 SPRAY, METERED NASAL at 09:28

## 2017-12-22 RX ADMIN — AZELASTINE HYDROCHLORIDE SCH SPR: 137 SPRAY, METERED NASAL at 09:15

## 2017-12-22 RX ADMIN — Medication SCH TAB: at 09:16

## 2017-12-22 RX ADMIN — ASPIRIN SCH MG: 81 TABLET ORAL at 09:15

## 2017-12-22 RX ADMIN — INSULIN LISPRO PRN UNIT: 100 INJECTION, SOLUTION INTRAVENOUS; SUBCUTANEOUS at 06:35

## 2017-12-22 RX ADMIN — LINEZOLID SCH MLS: 600 INJECTION, SOLUTION INTRAVENOUS at 09:35

## 2017-12-22 RX ADMIN — INSULIN LISPRO PRN UNIT: 100 INJECTION, SOLUTION INTRAVENOUS; SUBCUTANEOUS at 12:15

## 2017-12-22 NOTE — PDOC.PN
- Subjective


Encounter Start Date: 12/22/17


Encounter Start Time: 10:30





Pt without event.  walked from bed to chair with assistance.  Wife at the 

bedside.


  Discussed plan for pt to be discharged today.  He then stated he had gone to 

[place] yesterday and thought it was run down, wife shaking her head no in the 

background.  otherwise A&O X 3.





Cx rejected by the [facility] again.  i have requested the bug to be sent out to the 

reference lab for susceptibilities.





Discussed the case with Dr Lin, we  recommended discharging on Cubicin until 

this is back next week.  arrangements mad,e orders written.  Weekly labs 

ordered including CPK





10 point ROs performed and neg for all systems except as above





- Objective


MAR Reviewed: Yes


Vital Signs & Weight: 


 Vital Signs (12 hours)











  Temp Pulse Resp BP Pulse Ox


 


 12/22/17 11:35  97.8 F  60  18  152/94 H  96


 


 12/22/17 07:30  97.8 F  64  20  151/83 H  96


 


 12/22/17 05:12  97.7 F  74  18  155/75 H  96








 Weight











Admit Weight                   287 lb


 


Weight                         287 lb














I&O: 


 











 12/21/17 12/22/17 12/23/17





 06:59 06:59 06:59


 


Intake Total 2120 3030 


 


Output Total 950 300 


 


Balance 1170 2730 











Result Diagrams: 


 12/22/17 04:32





 12/16/17 04:59


Additional Labs: 


 Accuchecks











  12/22/17 12/22/17 12/21/17





  11:38 04:32 21:10


 


POC Glucose  193 H  158 H  195 H














  12/21/17





  15:59


 


POC Glucose  193 H














Phys Exam





- Physical Examination


Constitutional: NAD


HEENT: PERRLA, moist MMs, sclera anicteric, oral pharynx no lesions


Neck: no nodes, no JVD, supple, full ROM


Respiratory: no wheezing, no rales, no rhonchi, clear to auscultation bilateral


Cardiovascular: RRR, no significant murmur, no rub


Gastrointestinal: soft, non-tender, no distention, positive bowel sounds


Musculoskeletal: pulses present, edema present


Neurological: non-focal, normal sensation, moves all 4 limbs


Lymphatic: no nodes


Psychiatric: normal affect, A&O x 3


Skin: no rash, normal turgor, cap refill <2 seconds





Dx/Plan


(1) HTN (hypertension)


Code(s): I10 - ESSENTIAL (PRIMARY) HYPERTENSION   Status: Chronic   


Qualifiers: 


   Hypertension type: essential hypertension   Qualified Code(s): I10 - 

Essential (primary) hypertension   


Comment: pt states he had before, BP normal now, not on Rx.  CCM and monitor   





(2) PUD (peptic ulcer disease)


Code(s): K27.9 - PEPTIC ULC, SITE UNSP, UNSP AS AC OR CHR, W/O HEMOR OR PERF   

Status: Chronic   Comment: massive bleed requiring surgical oversew in past.  

GI prophylaxis   





(3) DM2 (diabetes mellitus, type 2)


Status: Chronic   


Qualifiers: 


   Diabetes mellitus complication status: without complication   Diabetes 

mellitus long term insulin use: without long term use   Qualified Code(s): 

E11.9 - Type 2 diabetes mellitus without complications   


Comment: SSI ordered, pt refusing.  noted.   





(4) Infection of prosthetic knee joint


Code(s): T84.59XA - INFECT/INFLM REACTION DUE TO OTH INTERNAL JOINT PROSTH, INIT

; Z96.659 - PRESENCE OF UNSPECIFIED ARTIFICIAL KNEE JOINT   Status: Acute   


Qualifiers: 


   Encounter type: initial encounter   Qualified Code(s): T84.59XA - Infection 

and inflammatory reaction due to other internal joint prosthesis, initial 

encounter; Z96.659 - Presence of unspecified artificial knee joint; Z96.659 - 

Presence of unspecified artificial knee joint   


Comment: s/p 1 stage revision by Dr Roberts.  Tissue cx, fluid cx, and swab cx 

sent, all positive for E faecium.  [facility] sensitivites keep terminating due to 

lack of growth in the wells, again today.  Vancomycin is static for enterococcus

, as is Zyvox.  On Vanc and Zyvox currently.  Change to cubicin at 1000mg q 24 

hours, first dos here, that is just shy of 8mg/kg/24 hours..reference lab 

specimen sent sensitivities.  Dr Lin to follow this up as an outpatient   





(5) PRISCA (obstructive sleep apnea)


Code(s): G47.33 - OBSTRUCTIVE SLEEP APNEA (ADULT) (PEDIATRIC)   Status: Chronic

   Comment: home CPAP   





(6) Knee arthropathy


Code(s): M12.9 - ARTHROPATHY, UNSPECIFIED   Status: Acute   





- Plan


cont current plan of care, continue antibiotics, PT/OT, out of bed/ambulate





* .


to [facility] in [place] swing bed today, will sign off in anticipation of discharge

## 2017-12-23 LAB
ANION GAP SERPL CALC-SCNC: 12 MMOL/L (ref 10–20)
BASOPHILS # BLD AUTO: 0.2 THOU/UL (ref 0–0.2)
BASOPHILS NFR BLD AUTO: 1.3 % (ref 0–1)
BUN SERPL-MCNC: 15 MG/DL (ref 8.4–25.7)
CALCIUM SERPL-MCNC: 9.1 MG/DL (ref 7.8–10.44)
CHLORIDE SERPL-SCNC: 105 MMOL/L (ref 98–107)
CO2 SERPL-SCNC: 27 MMOL/L (ref 23–31)
CREAT CL PREDICTED SERPL C-G-VRATE: 111 ML/MIN (ref 70–130)
EOSINOPHIL # BLD AUTO: 1.1 THOU/UL (ref 0–0.7)
EOSINOPHIL NFR BLD AUTO: 8.4 % (ref 0–10)
GLUCOSE SERPL-MCNC: 174 MG/DL (ref 83–110)
HGB BLD-MCNC: 11.9 G/DL (ref 14–18)
LYMPHOCYTES # BLD AUTO: 2.3 THOU/UL (ref 1.2–3.4)
LYMPHOCYTES NFR BLD AUTO: 18.4 % (ref 21–51)
MCH RBC QN AUTO: 27.7 PG (ref 27–31)
MCV RBC AUTO: 86.3 FL (ref 80–94)
MONOCYTES # BLD AUTO: 1.3 THOU/UL (ref 0.11–0.59)
MONOCYTES NFR BLD AUTO: 10.7 % (ref 0–10)
NEUTROPHILS # BLD AUTO: 7.6 THOU/UL (ref 1.4–6.5)
NEUTROPHILS NFR BLD AUTO: 61.1 % (ref 42–75)
PLATELET # BLD AUTO: 165 THOU/UL (ref 130–400)
POTASSIUM SERPL-SCNC: 3.4 MMOL/L (ref 3.5–5.1)
RBC # BLD AUTO: 4.29 MILL/UL (ref 4.7–6.1)
SODIUM SERPL-SCNC: 141 MMOL/L (ref 136–145)
WBC # BLD AUTO: 12.4 THOU/UL (ref 4.8–10.8)

## 2017-12-23 RX ADMIN — Medication SCH ML: at 21:07

## 2017-12-23 RX ADMIN — HYDROCODONE BITARTRATE AND ACETAMINOPHEN PRN TAB: 10; 325 TABLET ORAL at 00:05

## 2017-12-23 RX ADMIN — ALOGLIPTIN SCH MG: 6.25 TABLET, FILM COATED ORAL at 09:01

## 2017-12-23 RX ADMIN — Medication SCH TAB: at 09:02

## 2017-12-23 RX ADMIN — ASPIRIN SCH MG: 81 TABLET ORAL at 09:02

## 2017-12-23 RX ADMIN — Medication SCH MG: at 09:01

## 2017-12-23 RX ADMIN — INSULIN LISPRO PRN UNIT: 100 INJECTION, SOLUTION INTRAVENOUS; SUBCUTANEOUS at 17:35

## 2017-12-23 RX ADMIN — CYANOCOBALAMIN TAB 1000 MCG SCH MCG: 1000 TAB at 09:02

## 2017-12-23 RX ADMIN — MULTIPLE VITAMINS W/ MINERALS TAB SCH TAB: TAB at 09:03

## 2017-12-23 RX ADMIN — Medication SCH ML: at 09:03

## 2017-12-23 NOTE — RAD
SEMIUPRIGHT PORTABLE CHEST ONE VIEW:

12/23/17

 

HISTORY: 

82-year-old male with pulmonary fibrosis.

 

COMPARISON:  

3/22/17.

 

FINDINGS:  

Cardiomegaly with some mild vascular congestion and minimal interstitial changes and left pleural eff
usion, evidence for some acute vascular congestion and minimal congestive heart failure. Right PICC l
ine in place. Left ICD. No overt confluent process. 

 

IMPRESSION:  

Minimal cardiomegaly with developing vascular congestion and minimal interstitial edema and left pleu
ral effusion. These interstitial and congestive changes have progressed from a 12/12/17 study as well
. 

 

POS: TIFFANIE

## 2017-12-23 NOTE — PRG
DATE OF SERVICE:  12/22/2017

 

SUBJECTIVE:  The patient lying in bed, visiting with family, in no distress, but states that he had d
ifficulty breathing last night and became somewhat confused according to the family, although he does
 not remember this.  He also states that he did do therapy today and after discussion with physical t
herapy, has been placed on 50% weightbearing, but wishes to maintain on his knee immobilizer at all t
imes.  He has been using his BiPAP with supplemental oxygen and wishes to continue this.

 

OBJECTIVE:

VITAL SIGNS:  Blood pressure 164/74, pulse 71, O2 sats 94% on 1.5 liters.

LUNGS:  Show a few diffuse posterior wheezes.

CARDIAC:  Shows an irregularly irregular rhythm.

SKIN AND EXTREMITIES:  Show no edema.  Left knee is in a knee immobilizer.

 

LABORATORY DATA:  White count 12,400, hematocrit 37, hemoglobin 11.  Sodium 141, potassium 3.4, chlor
travon 105, bicarb 27, BUN 15, creatinine 0.92.  Accu-Cheks 174-223.

 

ASSESSMENT:

1.  An 82-year-old white male, atrial fibrillation, deep vein thrombosis, pulmonary embolus, and obst
ructive sleep apnea on 1.5 liters and CPAP with persistent symptoms of dyspnea at night and new onset
 of some wheezing and bronchospasm.

2.  Enterococcal infection of the left knee, on IV daptomycin for 6 weeks.  Tolerated well and doing 
well with therapy today, in fact, 50% weightbearing.

3.  Recurrent delirium worsened last night and according to family has occurred in the past and we wi
ll start olanzapine 5 mg at bedtime p.r.n. and monitor.

4.  Type 2 diabetes with slightly under control on sitagliptin and we will start on Accu-Cheks and mi
ld sliding scale.

## 2017-12-23 NOTE — HP
DATE OF ADMISSION:  12/22/2017

 

HISTORY OF PRESENT ILLNESS:  The patient is a very pleasant 82-year-old white male with history of mu
ltiple medical problems including type 2 diabetes, Santos esophagus, atrial fibrillation, recurrent 
deep venous thrombosis in the past, history of vancomycin-resistant Enterococcus who has been found t
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
on.  He has had a functional spacer placed and has been started on antibiotics.  Sensitivities of the
 Enterococcus are still pending, but at this time, he is on daptomycin 1000 mg IV daily until further
 sensitivities return.  He has been placed nonweightbearing and transferred to [facility] for 
continued antibiotics therapy.

 

PAST MEDICAL HISTORY:  As mentioned above is remarkable for recurrent DVT and atrial fibrillation, bu
t also severe life threatening GI bleed from a gastric ulcer and therefore is not on any anticoagulat
ion other than aspirin and Plavix.  He also has the above-mentioned history of Enterococcus urinary t
ract infection that was vancomycin resistant and most likely is the same organism that is causing his
 knee infection.

 

PAST SURGICAL HISTORY:  Positive for an appendectomy, tonsillectomy, the above-mentioned left knee re
placement and a duodenostomy after his bleeding ulcer.  Also remarkable for benign prostatic hypertro
phy, lower tract obstructive symptoms.

 

ALLERGIES:  He has no known allergies.

 

SOCIAL HISTORY:  He is retired.  Lives in [place] with his wife for many years.  He is a nonsmoker.

 

MEDICATIONS:  At present include hydrocodone 10/325 as needed for pain, vitamin C 2000 mg daily, aspi
rin 81 daily, Plavix 75 daily, B12 of 5000 mcg daily, daptomycin 1000 mg daily, finasteride 5 mg ethan
y, levothyroxine 75 mcg daily, rosuvastatin 20 mg nightly, Citracal Plus D 1 tablet daily, glucosamin
e/chondroitin daily, sitagliptin 50 mg daily, Zofran 4 mg as needed for nausea, Ditropan 10 mg nightl
y, Protonix 40 daily, tamsulosin 0.4 mg nightly.

 

REVIEW OF SYSTEMS:  HEENT:  Denies any headaches, dizziness, change in vision or hearing, hoarseness 
or dysphagia.  Pulmonary:  Denies cough, sputum production, pneumonia, asthma, tuberculosis.  Cardiov
ascular:  Denies any chest pain, orthopnea, paroxysmal nocturnal dyspnea, or edema, but is limited in
 his activities secondary to his arthritis.  Gastrointestinal:  He has the above-mentioned history of
 bleeding ulcer and has no abdominal pain, no constipation, no diarrhea.  Genitourinary:  Denies dysu
dennis, but does have nocturia, and decreased stream.  Musculoskeletal:  He has minimal pain in his left
 knee at rest, but in the knee immobilizer.  Neurologic:  Denies localized numbness, weakness in arms
 or extremities.

 

PHYSICAL EXAMINATION:

GENERAL:  Patient is an elderly white male in no acute distress, oriented x3 and cooperative.

VITAL SIGNS:  Show him to have blood pressure of 177/75, temperature 98, pulse 62, respirations 18, O
2 sat 92%.

HEENT:  Pupils equal, round, and reactive to light and accommodation.  Sclerae are anicteric, Conjunc
tivae pale.  Oral mucous membranes well hydrated.

NECK:  Supple.  There are no nodes or masses.  JVP is not elevated.

LUNGS:  Clear.

CARDIAC:  Shows irregularly irregular rhythm.  No gallops or murmurs.

ABDOMEN:  Soft and nontender with no masses or organomegaly.

SKIN/EXTREMITIES:  Left knee immobilizer.  Minimal tenderness.  No edema, clubbing, cyanosis.  No dec
ubitus.

NEUROLOGICAL:  Intact.

 

LABORATORY DATA:  Most recently showed white count 10,900, hematocrit 36, hemoglobin 11, 19% bands.  
Sodium 140, potassium 4.3, chloride 111, bicarbonate 25, BUN 21, creatinine 1.25, GFR 55.

 

ASSESSMENT:  The patient is an 82-year-old white male with multiple medical problems including atrial
 fibrillation, deep venous thrombosis, with pulmonary embolus, obstructive sleep apnea, CPAP, type 2 
diabetes, and recurrent urinary tract infection secondary to Enterococcus who presents with Enterococ
cus infection of the left prosthetic.  He knee is now status post removal of prosthesis and placement
 of spacers and initiation of treatment with Cubicin (daptomycin) 1000 mg IV daily for the next 6 wee
ks.  He will be monitored closely by myself and by Dr. Mack with serial renal function test.  He mery
l be given nonweightbearing orders or PT until confirmed next week, 50% nonweightbearing, be continue
d on his CPAP with supplemental oxygen as needed.  He will be continued on his home medications as si
tagliptin for his diabetes with Accu-Cheks refused by the patient, we will only check daily labs to d
etermine if need for Accu-Cheks as sitagliptin will not cause hypoglycemia.  We will maintain often s
ignificant anticoagulation as patient has had significant GI bleed in the past.

## 2017-12-24 LAB
ANION GAP SERPL CALC-SCNC: 13 MMOL/L (ref 10–20)
BASOPHILS # BLD AUTO: 0.2 THOU/UL (ref 0–0.2)
BASOPHILS NFR BLD AUTO: 1.5 % (ref 0–1)
BUN SERPL-MCNC: 16 MG/DL (ref 8.4–25.7)
CALCIUM SERPL-MCNC: 9.1 MG/DL (ref 7.8–10.44)
CHLORIDE SERPL-SCNC: 105 MMOL/L (ref 98–107)
CO2 SERPL-SCNC: 27 MMOL/L (ref 23–31)
CREAT CL PREDICTED SERPL C-G-VRATE: 103 ML/MIN (ref 70–130)
EOSINOPHIL # BLD AUTO: 1 THOU/UL (ref 0–0.7)
EOSINOPHIL NFR BLD AUTO: 7.6 % (ref 0–10)
GLUCOSE SERPL-MCNC: 183 MG/DL (ref 83–110)
HGB BLD-MCNC: 11.6 G/DL (ref 14–18)
LYMPHOCYTES # BLD AUTO: 2.4 THOU/UL (ref 1.2–3.4)
LYMPHOCYTES NFR BLD AUTO: 19.1 % (ref 21–51)
MCH RBC QN AUTO: 29.4 PG (ref 27–31)
MCV RBC AUTO: 85.1 FL (ref 80–94)
MONOCYTES # BLD AUTO: 1.4 THOU/UL (ref 0.11–0.59)
MONOCYTES NFR BLD AUTO: 11 % (ref 0–10)
NEUTROPHILS # BLD AUTO: 7.6 THOU/UL (ref 1.4–6.5)
NEUTROPHILS NFR BLD AUTO: 61 % (ref 42–75)
PLATELET # BLD AUTO: 156 THOU/UL (ref 130–400)
POTASSIUM SERPL-SCNC: 3.6 MMOL/L (ref 3.5–5.1)
RBC # BLD AUTO: 3.94 MILL/UL (ref 4.7–6.1)
REF LAB NAME: (no result)
REF LAB TEST NAME: (no result)
SODIUM SERPL-SCNC: 141 MMOL/L (ref 136–145)
WBC # BLD AUTO: 12.5 THOU/UL (ref 4.8–10.8)

## 2017-12-24 RX ADMIN — INSULIN LISPRO PRN UNIT: 100 INJECTION, SOLUTION INTRAVENOUS; SUBCUTANEOUS at 12:29

## 2017-12-24 RX ADMIN — ALOGLIPTIN SCH MG: 6.25 TABLET, FILM COATED ORAL at 08:34

## 2017-12-24 RX ADMIN — Medication SCH ML: at 08:35

## 2017-12-24 RX ADMIN — ASPIRIN SCH MG: 81 TABLET ORAL at 08:35

## 2017-12-24 RX ADMIN — CYANOCOBALAMIN TAB 1000 MCG SCH: 1000 TAB at 08:35

## 2017-12-24 RX ADMIN — INSULIN LISPRO PRN UNIT: 100 INJECTION, SOLUTION INTRAVENOUS; SUBCUTANEOUS at 05:50

## 2017-12-24 RX ADMIN — MULTIPLE VITAMINS W/ MINERALS TAB SCH TAB: TAB at 08:35

## 2017-12-24 RX ADMIN — Medication SCH ML: at 20:23

## 2017-12-24 RX ADMIN — Medication SCH MG: at 08:34

## 2017-12-24 RX ADMIN — Medication SCH TAB: at 08:35

## 2017-12-25 LAB
ALBUMIN SERPL BCG-MCNC: 2.9 G/DL (ref 3.4–4.8)
ALP SERPL-CCNC: 120 U/L (ref 40–150)
ALT SERPL W P-5'-P-CCNC: 29 U/L (ref 8–55)
ANION GAP SERPL CALC-SCNC: 13 MMOL/L (ref 10–20)
AST SERPL-CCNC: 34 U/L (ref 5–34)
BASOPHILS # BLD AUTO: 0.2 THOU/UL (ref 0–0.2)
BASOPHILS NFR BLD AUTO: 1.2 % (ref 0–1)
BILIRUB SERPL-MCNC: 0.8 MG/DL (ref 0.2–1.2)
BUN SERPL-MCNC: 18 MG/DL (ref 8.4–25.7)
CALCIUM SERPL-MCNC: 9.2 MG/DL (ref 7.8–10.44)
CHLORIDE SERPL-SCNC: 105 MMOL/L (ref 98–107)
CK SERPL-CCNC: 60 U/L (ref 30–200)
CO2 SERPL-SCNC: 27 MMOL/L (ref 23–31)
CREAT CL PREDICTED SERPL C-G-VRATE: 97 ML/MIN (ref 70–130)
CRP SERPL-MCNC: 7.47 MG/DL
EOSINOPHIL # BLD AUTO: 0.9 THOU/UL (ref 0–0.7)
EOSINOPHIL NFR BLD AUTO: 6 % (ref 0–10)
GLOBULIN SER CALC-MCNC: 3.4 G/DL (ref 2.4–3.5)
GLUCOSE SERPL-MCNC: 188 MG/DL (ref 83–110)
HGB BLD-MCNC: 11.9 G/DL (ref 14–18)
LYMPHOCYTES # BLD AUTO: 2.4 THOU/UL (ref 1.2–3.4)
LYMPHOCYTES NFR BLD AUTO: 16.3 % (ref 21–51)
MCH RBC QN AUTO: 28.7 PG (ref 27–31)
MCV RBC AUTO: 86.1 FL (ref 80–94)
MONOCYTES # BLD AUTO: 1.5 THOU/UL (ref 0.11–0.59)
MONOCYTES NFR BLD AUTO: 10.1 % (ref 0–10)
NEUTROPHILS # BLD AUTO: 9.6 THOU/UL (ref 1.4–6.5)
NEUTROPHILS NFR BLD AUTO: 66.4 % (ref 42–75)
PLATELET # BLD AUTO: 166 THOU/UL (ref 130–400)
POTASSIUM SERPL-SCNC: 3.8 MMOL/L (ref 3.5–5.1)
RBC # BLD AUTO: 4.15 MILL/UL (ref 4.7–6.1)
RBC UR QL AUTO: (no result) HPF (ref 0–3)
SODIUM SERPL-SCNC: 141 MMOL/L (ref 136–145)
SP GR UR STRIP: 1.01 (ref 1–1.03)
WBC # BLD AUTO: 14.4 THOU/UL (ref 4.8–10.8)
WBC UR QL AUTO: (no result) HPF (ref 0–3)

## 2017-12-25 RX ADMIN — CYANOCOBALAMIN TAB 1000 MCG SCH MCG: 1000 TAB at 12:08

## 2017-12-25 RX ADMIN — INSULIN LISPRO PRN UNIT: 100 INJECTION, SOLUTION INTRAVENOUS; SUBCUTANEOUS at 12:08

## 2017-12-25 RX ADMIN — INSULIN LISPRO PRN UNIT: 100 INJECTION, SOLUTION INTRAVENOUS; SUBCUTANEOUS at 20:13

## 2017-12-25 RX ADMIN — Medication SCH ML: at 20:13

## 2017-12-25 RX ADMIN — MULTIPLE VITAMINS W/ MINERALS TAB SCH TAB: TAB at 08:29

## 2017-12-25 RX ADMIN — Medication SCH MG: at 08:28

## 2017-12-25 RX ADMIN — Medication SCH ML: at 08:29

## 2017-12-25 RX ADMIN — ASPIRIN SCH MG: 81 TABLET ORAL at 08:28

## 2017-12-25 RX ADMIN — INSULIN LISPRO PRN UNIT: 100 INJECTION, SOLUTION INTRAVENOUS; SUBCUTANEOUS at 16:54

## 2017-12-25 RX ADMIN — Medication PRN ML: at 13:28

## 2017-12-25 RX ADMIN — INSULIN LISPRO PRN UNIT: 100 INJECTION, SOLUTION INTRAVENOUS; SUBCUTANEOUS at 05:57

## 2017-12-25 RX ADMIN — ALOGLIPTIN SCH MG: 6.25 TABLET, FILM COATED ORAL at 08:28

## 2017-12-25 RX ADMIN — Medication SCH TAB: at 08:28

## 2017-12-26 LAB
ANION GAP SERPL CALC-SCNC: 14 MMOL/L (ref 10–20)
BASOPHILS # BLD AUTO: 0.2 THOU/UL (ref 0–0.2)
BASOPHILS NFR BLD AUTO: 1.2 % (ref 0–1)
BUN SERPL-MCNC: 20 MG/DL (ref 8.4–25.7)
CALCIUM SERPL-MCNC: 9.4 MG/DL (ref 7.8–10.44)
CHLORIDE SERPL-SCNC: 103 MMOL/L (ref 98–107)
CO2 SERPL-SCNC: 28 MMOL/L (ref 23–31)
CREAT CL PREDICTED SERPL C-G-VRATE: 93 ML/MIN (ref 70–130)
EOSINOPHIL # BLD AUTO: 0.9 THOU/UL (ref 0–0.7)
EOSINOPHIL NFR BLD AUTO: 6.4 % (ref 0–10)
GLUCOSE SERPL-MCNC: 212 MG/DL (ref 83–110)
HGB BLD-MCNC: 11.7 G/DL (ref 14–18)
LYMPHOCYTES # BLD AUTO: 1.7 THOU/UL (ref 1.2–3.4)
LYMPHOCYTES NFR BLD AUTO: 11.9 % (ref 21–51)
MCH RBC QN AUTO: 27.5 PG (ref 27–31)
MCV RBC AUTO: 86.3 FL (ref 80–94)
MONOCYTES # BLD AUTO: 1.5 THOU/UL (ref 0.11–0.59)
MONOCYTES NFR BLD AUTO: 10.9 % (ref 0–10)
NEUTROPHILS # BLD AUTO: 9.7 THOU/UL (ref 1.4–6.5)
NEUTROPHILS NFR BLD AUTO: 69.5 % (ref 42–75)
PLATELET # BLD AUTO: 163 THOU/UL (ref 130–400)
POTASSIUM SERPL-SCNC: 4.2 MMOL/L (ref 3.5–5.1)
RBC # BLD AUTO: 4.25 MILL/UL (ref 4.7–6.1)
SODIUM SERPL-SCNC: 141 MMOL/L (ref 136–145)
WBC # BLD AUTO: 13.9 THOU/UL (ref 4.8–10.8)

## 2017-12-26 RX ADMIN — Medication PRN ML: at 03:15

## 2017-12-26 RX ADMIN — Medication SCH ML: at 08:16

## 2017-12-26 RX ADMIN — Medication PRN ML: at 13:40

## 2017-12-26 RX ADMIN — ALOGLIPTIN SCH MG: 6.25 TABLET, FILM COATED ORAL at 08:14

## 2017-12-26 RX ADMIN — Medication PRN ML: at 13:03

## 2017-12-26 RX ADMIN — ASPIRIN SCH MG: 81 TABLET ORAL at 08:14

## 2017-12-26 RX ADMIN — INSULIN LISPRO PRN UNIT: 100 INJECTION, SOLUTION INTRAVENOUS; SUBCUTANEOUS at 06:07

## 2017-12-26 RX ADMIN — INSULIN LISPRO PRN UNIT: 100 INJECTION, SOLUTION INTRAVENOUS; SUBCUTANEOUS at 11:58

## 2017-12-26 RX ADMIN — INSULIN LISPRO PRN UNIT: 100 INJECTION, SOLUTION INTRAVENOUS; SUBCUTANEOUS at 17:03

## 2017-12-26 RX ADMIN — Medication SCH TAB: at 08:14

## 2017-12-26 RX ADMIN — CYANOCOBALAMIN TAB 1000 MCG SCH MCG: 1000 TAB at 08:15

## 2017-12-26 RX ADMIN — Medication PRN ML: at 05:28

## 2017-12-26 RX ADMIN — Medication SCH MG: at 08:14

## 2017-12-26 RX ADMIN — MULTIPLE VITAMINS W/ MINERALS TAB SCH TAB: TAB at 08:15

## 2017-12-26 RX ADMIN — Medication SCH ML: at 22:00

## 2017-12-26 NOTE — PRG
DATE OF SERVICE:  12/24/2017

 

SUBJECTIVE:  The patient is awake and alert, in no distress, but is becoming confused every night and
 has become more sedated when given olanzapine.

 

OBJECTIVE:  

VITAL SIGNS:  Temperature is 98, pulse 62, respirations 18, O2 sats 94% on 2 liters, blood pressure i
s 174/81.  

 

LABORATORY:  Sodium 141, potassium 3.6, chloride 105, bicarbonate 27, BUN 16, creatinine 0.99, glucos
e 183.  Accu-Cheks elevated to 218-223, calcium 9.1.  

 

LUNGS:  Lungs are clear.  

CARDIAC EXAMINATION:  Cardiac examination shows regular rhythm.  

EXTREMITIES:  Left knee is swollen, but showing minimal erythema and is able to mobilize in the bed w
ith no discomfort.

 

ASSESSMENT:  

1.  Prosthetic knee infection with spacers being treated for Enterococcus infection with daptomycin w
ith no evidence of significant infection in the knee.

2.  New onset of confusion at night, unknown etiology with no response to olanzapine and we will disc
ontinue and reorient. 

 

PLAN:  

1.  Obtain CBC, BMP, CRP in the a.m., evaluate for signs of increasing infection.  

2.  Continue daptomycin.  

3.  Continue knee immobilizer.  

4.  Continue diabetes on sliding scale, as slightly controlled.  

5.  Continue CPAP for obstructive sleep apnea.

## 2017-12-26 NOTE — RAD
PORTABLE CHEST:

 

Date: 12-26-17 

 

History: Follow up CHF. 

 

Comparison: 12-23-17

 

FINDINGS: 

This exam is limited due to under penetrated technique. Dual-lead left subclavian cardiac pacemaking 
device remains in place. Right sided PICC line is also stable in position. Cardiac silhouette remains
 enlarged. Pulmonary vasculature while magnified by depth of inspiration and portable technique, does
 appear mildly increased. There is suboptimal evaluation of the left lung base. There are findings carrington
ggestive of a small left pleural effusion and atelectasis.  Vascular calcifications are seen in the t
horacic aorta. No other interval change. 

 

IMPRESSION: 

Mild CHF with small left pleural effusion, not significantly changed from the prior exam. 

 

POS: [facility]

## 2017-12-26 NOTE — PRG
DATE OF SERVICE:  12/25/2017

 

SUBJECTIVE:  The patient feels well during the day, but is confused at night.  No respiratory distres
s.  No cough, eating well.  No agitation.

 

OBJECTIVE:  

LABORATORY:  Shows white count of 14,400, hematocrit 35, hemoglobin 11.  Sodium 141, potassium 3.8, c
hloride 105, bicarbonate 27, BUN was 18, creatinine 1.05, glucose 149-210, CRPS up to 7.47 from previ
ous visit 10 days ago 1.06.

LUNGS:  Clear, anterior do show some congestion posteriorly.  

 

Chest x-ray does show minimal pulmonary congestion.

 

ASSESSMENT AND PLAN:  

1.  Persistent confusion on olanzapine and will discontinue and start on melatonin at night.  

2.  Possible worsening infection of left knee with increasing leukocytosis and CRP on daptomycin.  We
 will increase broad spectrum coverage with meropenem and discussed with Dr. Mack when available.

3.  Mild pulmonary congestion.  Will start on furosemide one dose in the a.m., 40 mg and repeat basic
 metabolic profile, BNP, CRP in the a.m. as well as CBC.

## 2017-12-27 RX ADMIN — ALOGLIPTIN SCH MG: 6.25 TABLET, FILM COATED ORAL at 08:40

## 2017-12-27 RX ADMIN — ASPIRIN SCH MG: 81 TABLET ORAL at 08:40

## 2017-12-27 RX ADMIN — MULTIPLE VITAMINS W/ MINERALS TAB SCH TAB: TAB at 08:41

## 2017-12-27 RX ADMIN — Medication SCH ML: at 21:23

## 2017-12-27 RX ADMIN — Medication PRN ML: at 14:37

## 2017-12-27 RX ADMIN — Medication SCH ML: at 08:41

## 2017-12-27 RX ADMIN — Medication SCH MG: at 08:40

## 2017-12-27 RX ADMIN — INSULIN LISPRO PRN UNIT: 100 INJECTION, SOLUTION INTRAVENOUS; SUBCUTANEOUS at 21:24

## 2017-12-27 RX ADMIN — INSULIN LISPRO PRN UNIT: 100 INJECTION, SOLUTION INTRAVENOUS; SUBCUTANEOUS at 11:51

## 2017-12-27 RX ADMIN — CYANOCOBALAMIN TAB 1000 MCG SCH MCG: 1000 TAB at 08:40

## 2017-12-27 RX ADMIN — INSULIN LISPRO PRN UNIT: 100 INJECTION, SOLUTION INTRAVENOUS; SUBCUTANEOUS at 05:35

## 2017-12-27 RX ADMIN — Medication SCH TAB: at 08:40

## 2017-12-27 NOTE — PRG
DATE OF SERVICE:  12/27/2017

 

SUBJECTIVE:  The patient feels well with no knee pain.  States that he slept well through the night, 
although the nurses state that he was intermittently awake and confused.  At this time, he appears to
 be in no distress.

 

OBJECTIVE:

VITAL SIGNS:  Shows temperature 97.1, pulse 62, respirations 24, O2 sats 95% on 2 liters.

LUNGS:  Clear.

CARDIAC EXAMINATION:  Shows regular rhythm.  No gallops or murmurs.

 

LABORATORY DATA:  Yesterday showed white count was decreased to 13,900, sed rate of 68, but CRP was e
levated to 7.47.  Cultures from wound in knee still showing Enterococcus faecium with final sensitivi
ty to return in 2 days.

 

ASSESSMENT:

1.  Prosthetic knee infection with Enterococcus, on daptomycin.  Stable renal function with persisten
t increase in inflammatory markers with new finding of increased confusion at night.

2.  Stable diabetes on sliding scale.

3.  Stable obstructive sleep apnea, but unable to wear at night with oxygen.

 

PLAN:  Continue 2 liters O2 attempt to wean off.  Continue melatonin at night and await results of cu
ltures and continue daptomycin and discussed with Dr. Mack.

## 2017-12-28 LAB
ANION GAP SERPL CALC-SCNC: 13 MMOL/L (ref 10–20)
BASOPHILS # BLD AUTO: 0.1 THOU/UL (ref 0–0.2)
BASOPHILS NFR BLD AUTO: 0.6 % (ref 0–1)
BUN SERPL-MCNC: 21 MG/DL (ref 8.4–25.7)
CALCIUM SERPL-MCNC: 10.2 MG/DL (ref 7.8–10.44)
CHLORIDE SERPL-SCNC: 101 MMOL/L (ref 98–107)
CO2 SERPL-SCNC: 29 MMOL/L (ref 23–31)
CREAT CL PREDICTED SERPL C-G-VRATE: 98 ML/MIN (ref 70–130)
EOSINOPHIL # BLD AUTO: 0.9 THOU/UL (ref 0–0.7)
EOSINOPHIL NFR BLD AUTO: 8.5 % (ref 0–10)
GLUCOSE SERPL-MCNC: 223 MG/DL (ref 83–110)
HGB BLD-MCNC: 12 G/DL (ref 14–18)
LYMPHOCYTES # BLD AUTO: 1.6 THOU/UL (ref 1.2–3.4)
LYMPHOCYTES NFR BLD AUTO: 15.3 % (ref 21–51)
MCH RBC QN AUTO: 30.1 PG (ref 27–31)
MCV RBC AUTO: 93.7 FL (ref 80–94)
MONOCYTES # BLD AUTO: 1.2 THOU/UL (ref 0.11–0.59)
MONOCYTES NFR BLD AUTO: 11.9 % (ref 0–10)
NEUTROPHILS # BLD AUTO: 6.5 THOU/UL (ref 1.4–6.5)
NEUTROPHILS NFR BLD AUTO: 63.7 % (ref 42–75)
PLATELET # BLD AUTO: 177 THOU/UL (ref 130–400)
POTASSIUM SERPL-SCNC: 4.5 MMOL/L (ref 3.5–5.1)
RBC # BLD AUTO: 4 MILL/UL (ref 4.7–6.1)
SODIUM SERPL-SCNC: 138 MMOL/L (ref 136–145)
WBC # BLD AUTO: 10.3 THOU/UL (ref 4.8–10.8)

## 2017-12-28 RX ADMIN — ALOGLIPTIN SCH MG: 6.25 TABLET, FILM COATED ORAL at 08:39

## 2017-12-28 RX ADMIN — HYDROCODONE BITARTRATE AND ACETAMINOPHEN PRN TAB: 10; 325 TABLET ORAL at 04:22

## 2017-12-28 RX ADMIN — CYANOCOBALAMIN TAB 1000 MCG SCH MCG: 1000 TAB at 08:40

## 2017-12-28 RX ADMIN — Medication SCH TAB: at 08:40

## 2017-12-28 RX ADMIN — Medication SCH ML: at 20:58

## 2017-12-28 RX ADMIN — ASPIRIN SCH MG: 81 TABLET ORAL at 08:40

## 2017-12-28 RX ADMIN — MULTIPLE VITAMINS W/ MINERALS TAB SCH TAB: TAB at 08:40

## 2017-12-28 RX ADMIN — Medication SCH ML: at 08:41

## 2017-12-28 RX ADMIN — Medication SCH MG: at 08:39

## 2017-12-28 RX ADMIN — MAGNESIUM HYDROXIDE PRN ML: 400 SUSPENSION ORAL at 16:15

## 2017-12-28 RX ADMIN — Medication PRN ML: at 05:10

## 2017-12-28 RX ADMIN — INSULIN LISPRO PRN UNIT: 100 INJECTION, SOLUTION INTRAVENOUS; SUBCUTANEOUS at 20:57

## 2017-12-28 RX ADMIN — INSULIN LISPRO PRN UNIT: 100 INJECTION, SOLUTION INTRAVENOUS; SUBCUTANEOUS at 05:09

## 2017-12-28 RX ADMIN — INSULIN LISPRO PRN UNIT: 100 INJECTION, SOLUTION INTRAVENOUS; SUBCUTANEOUS at 11:53

## 2017-12-28 NOTE — RAD
CHEST ONE VIEW: 

 

History: Cough. CHF. 

 

Comparison: 12-26-17

 

FINDINGS: 

Heart size continues to be enlarged. Mild pulmonary venous congestion. A peripheral central venous ca
theter tip is in the SVC in good position. Cardiac size is similar. 

 

IMPRESSION: 

No significant change in the radiographic appearance of the chest. Cardiomegaly with mild pulmonary v
enous congestion.

 

POS: [facility]

## 2017-12-28 NOTE — PRG
DATE OF SERVICE:  12/28/2017

 

SUBJECTIVE:  The patient feels well, lying in bed with no complaints except for occasional cough, sta
ble dyspnea, stable pain in the knee, and insomnia.  Denies all confusion or agitation.

 

OBJECTIVE:  Shows hematocrit 37, hemoglobin 12, white count 10,300.  Sodium 138, potassium 4.5, chlor
travon 101, bicarb 29, BUN 21, creatinine 1.04, glucose ranged from 155 to 211, BNP is elevated at 342. 
 Chest x-ray showed mild pulmonary venous congestion and cardiomegaly.  Vital signs show temperature 
97.2, pulse 67, respirations 18, O2 saturation 90% on 2 liters, blood pressure 160/77.  Cultures from
 the knee for enterococcus has sensitivities back tomorrow and left knee appears to be healing well w
ith no erythema or warmth or drainage.

 

ASSESSMENT:

1.  Resolving enterococcal infection of the left knee prosthetic with spacer who will have sensitivit
ies return tomorrow.

2.  Obstructive sleep apnea, diastolic heart failure, stable with elevated BNP, no significant bronch
ospasm.

3.  Stable type 2 diabetes.

4.  Obstructive sleep apnea with inability to wear oxygen and CPAP.

 

PLAN:

1.  Continue daptomycin and check sensitivities tomorrow and discuss with Dr. Mack.

2.  Continue PT, OT.

3.  Continue melatonin at night, appears to be stable.

4.  Continue Accu-Cheks, controlled diabetes.

## 2017-12-29 RX ADMIN — CYANOCOBALAMIN TAB 1000 MCG SCH MCG: 1000 TAB at 09:06

## 2017-12-29 RX ADMIN — INSULIN LISPRO PRN UNIT: 100 INJECTION, SOLUTION INTRAVENOUS; SUBCUTANEOUS at 21:40

## 2017-12-29 RX ADMIN — ALOGLIPTIN SCH MG: 6.25 TABLET, FILM COATED ORAL at 09:06

## 2017-12-29 RX ADMIN — MAGNESIUM HYDROXIDE PRN ML: 400 SUSPENSION ORAL at 14:31

## 2017-12-29 RX ADMIN — Medication SCH TAB: at 09:06

## 2017-12-29 RX ADMIN — INSULIN LISPRO PRN UNIT: 100 INJECTION, SOLUTION INTRAVENOUS; SUBCUTANEOUS at 05:20

## 2017-12-29 RX ADMIN — ASPIRIN SCH MG: 81 TABLET ORAL at 09:06

## 2017-12-29 RX ADMIN — Medication SCH ML: at 21:41

## 2017-12-29 RX ADMIN — MULTIPLE VITAMINS W/ MINERALS TAB SCH TAB: TAB at 09:07

## 2017-12-29 RX ADMIN — Medication SCH MG: at 09:06

## 2017-12-29 RX ADMIN — Medication SCH ML: at 09:07

## 2017-12-30 RX ADMIN — Medication SCH MG: at 09:07

## 2017-12-30 RX ADMIN — Medication PRN ML: at 14:23

## 2017-12-30 RX ADMIN — ALOGLIPTIN SCH MG: 6.25 TABLET, FILM COATED ORAL at 09:07

## 2017-12-30 RX ADMIN — Medication SCH TAB: at 09:06

## 2017-12-30 RX ADMIN — MULTIPLE VITAMINS W/ MINERALS TAB SCH TAB: TAB at 09:07

## 2017-12-30 RX ADMIN — Medication PRN ML: at 13:46

## 2017-12-30 RX ADMIN — INSULIN LISPRO PRN UNIT: 100 INJECTION, SOLUTION INTRAVENOUS; SUBCUTANEOUS at 20:59

## 2017-12-30 RX ADMIN — ASPIRIN SCH MG: 81 TABLET ORAL at 09:07

## 2017-12-30 RX ADMIN — INSULIN LISPRO PRN UNIT: 100 INJECTION, SOLUTION INTRAVENOUS; SUBCUTANEOUS at 05:11

## 2017-12-30 RX ADMIN — Medication SCH ML: at 09:06

## 2017-12-30 RX ADMIN — INSULIN LISPRO PRN UNIT: 100 INJECTION, SOLUTION INTRAVENOUS; SUBCUTANEOUS at 12:09

## 2017-12-30 RX ADMIN — CYANOCOBALAMIN TAB 1000 MCG SCH MCG: 1000 TAB at 09:07

## 2017-12-30 RX ADMIN — Medication SCH ML: at 20:50

## 2017-12-30 NOTE — PRG
DATE OF SERVICE:  12/30/2017

 

DATE OF ADMISSION:  12/22/2017

 

HISTORY OF PRESENT ILLNESS:  Mr. Murray is an 82-year-old white male with multiple medical problems, bu
t basically had an infection of left prosthetic knee.  It was documented to be Enterococcus and he ha
d a functional spacer placed with antibiotics.  He is on daptomycin 1000 mg IV until further sensitiv
ities return.  He is nonweightbearing and transferred to [facility] for continued ant
ibiotics care.

 

SUBJECTIVE:  The patient is oriented to person and place, but not to time.  He states he is doing wel
l, has no complaints, states he is eating well and has random pain.

 

PHYSICAL EXAMINATION:

VITAL SIGNS:  Reveal blood pressure elevated this morning at 181/86, pulse 60-65, respirations 18-21,
 O2 sat 93%-94% on 2 liters.  Point of care sugars reveals the patient's fasting this morning was 160
, before lunch 204, before supper 138.

GENERAL:  This is a well-developed, well-nourished, obese white male lying in bed with his knee immob
ilizer still in place.  He has no specific complaints at this time.

HEENT:  Reveals normocephalic, nontraumatic cranium.  Pupils are equally round, reactive.  Nose and t
hroat are dry, but clear.

NECK:  Supple.

CHEST:  Clear to auscultation, no rales, rhonchi, wheezes or cough is heard.

HEART:  Reveals a regular rate and rhythm without murmurs, gallops or rubs.

ABDOMEN:  Obese, soft, nontender, no organomegaly.  Normal bowel sounds are noted.

GENITROURINARY:  Deferred.

EXTREMITIES:  Reveal left leg in a knee immobilizer which he moves if needed.

 

ASSESSMENT:

1.  Enterococcal infection in the left prosthetic knee with spacer.

2.  Diabetes type 2.

3.  Obstructive sleep apnea.

4.  Diastolic heart failure.

5.  Santos's esophagitis.

6.  History of atrial fibrillation.

7.  History of recurrent deep venous thrombosis.

8.  History of gastric ulcers with life threatening gastrointestinal bleed.

 

PLAN:

1.  Continue present medications.

2.  Dr. Torres talked with Dr. Mack about keeping him on the daptomycin until those cultures retur
n from I believe [facility].

3.  Decubitus precautions.

4.  Stress ulcer prophylaxis.

5.  DVT precautions.

6.  Continue present antibiotics.

## 2017-12-31 RX ADMIN — ALOGLIPTIN SCH MG: 6.25 TABLET, FILM COATED ORAL at 10:18

## 2017-12-31 RX ADMIN — Medication PRN ML: at 14:36

## 2017-12-31 RX ADMIN — Medication SCH MG: at 10:18

## 2017-12-31 RX ADMIN — INSULIN LISPRO PRN UNIT: 100 INJECTION, SOLUTION INTRAVENOUS; SUBCUTANEOUS at 16:40

## 2017-12-31 RX ADMIN — Medication SCH ML: at 21:55

## 2017-12-31 RX ADMIN — Medication SCH TAB: at 10:18

## 2017-12-31 RX ADMIN — ASPIRIN SCH MG: 81 TABLET ORAL at 10:19

## 2017-12-31 RX ADMIN — CYANOCOBALAMIN TAB 1000 MCG SCH MCG: 1000 TAB at 10:19

## 2017-12-31 RX ADMIN — Medication PRN ML: at 14:06

## 2017-12-31 RX ADMIN — INSULIN LISPRO PRN UNIT: 100 INJECTION, SOLUTION INTRAVENOUS; SUBCUTANEOUS at 21:55

## 2017-12-31 RX ADMIN — MULTIPLE VITAMINS W/ MINERALS TAB SCH TAB: TAB at 10:19

## 2017-12-31 RX ADMIN — INSULIN LISPRO PRN UNIT: 100 INJECTION, SOLUTION INTRAVENOUS; SUBCUTANEOUS at 12:31

## 2017-12-31 RX ADMIN — Medication SCH ML: at 10:19

## 2017-12-31 NOTE — PRG
DATE OF SERVICE:  12/31/2017

 

HISTORY OF PRESENT ILLNESS:  Patient is a very pleasant 82-year-old white male with multiple medical 
problems.  Unfortunately, he had an infected left prosthetic knee.  It was documented as Enterococcus
 and he had functional spacer placed with antibiotics.  He was also placed on daptomycin 1000 mg IV u
ntil further sensitivities returned from [facility].  He is nonweightbearing and was transferred to 
[facility] for continued antibiotic care.

 

SUBJECTIVE:  The patient states he is doing well.  He is oriented to person, place, but not the time.
  He has no complaints today.  He states he does not like to eat much and he is enough.

 

LABORATORY:  No labs were done today.

 

Point of care sugars reveal fasting this morning 140, before lunch 170, before supper 176.

 

PHYSICAL EXAMINATION:

GENERAL:  This is a well-developed, well-nourished, obese white male in no apparent distress at this 
time.

OBJECTIVE:  Vital signs this morning reveal blood pressure 134/71, pulse 59-62, respirations 18-22, O
2 sat 90-98% on 2 liters, T-max 97.8.

HEENT:  Reveals normocephalic, nontraumatic cranium.  Pupils are equally round and reactive.  Nose an
d throat are clear and dry.

NECK:  Supple, without masses, nodes or bruits.

LUNGS:  Chest is clear to auscultation.  No rales, no rhonchi, no wheezes are heard.  No cough is not
ed.

HEART:  Reveals a regular rate and rhythm without murmurs, gallops or rubs.

ABDOMEN:  Obese, soft, nontender, without organomegaly.  Normal bowel sounds are noted in all 4 quadr
ants.

:  Deferred.

EXTREMITIES:  Reveal left leg, knee immobilizer which is in place.  No significant swelling or rednes
s is noted.

 

ASSESSMENT:

1.  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

2.  Diabetes type 2.

3.  Obstructive sleep apnea.

4.  Diastolic heart failure.

5.  Santos's esophagitis.

6.  History of atrial fibrillation.

7.  History of recurrent deep venous thromboses.

8.  History of gastric ulcers with life threatening gastrointestinal bleed.

9.  Generalized weakness.

10.  Occasional confusion.

 

PLAN:

1.  Continue present medications.

2.  We will keep the patient on daptomycin until Dr. Mack gets his cultures back from [facility].

3.  Decubitus precautions.

4.  Stress ulcer prophylaxis.

5.  DVT precautions.

6.  Continue present antibiotics.

7.  Continue present medications.

8.  Supportive care.

## 2018-01-01 RX ADMIN — CYANOCOBALAMIN TAB 1000 MCG SCH MCG: 1000 TAB at 08:14

## 2018-01-01 RX ADMIN — Medication SCH ML: at 08:15

## 2018-01-01 RX ADMIN — Medication SCH MG: at 08:13

## 2018-01-01 RX ADMIN — ASPIRIN SCH MG: 81 TABLET ORAL at 08:14

## 2018-01-01 RX ADMIN — MULTIPLE VITAMINS W/ MINERALS TAB SCH TAB: TAB at 08:15

## 2018-01-01 RX ADMIN — ALOGLIPTIN SCH MG: 6.25 TABLET, FILM COATED ORAL at 08:12

## 2018-01-01 RX ADMIN — Medication SCH TAB: at 08:14

## 2018-01-01 RX ADMIN — Medication SCH ML: at 21:06

## 2018-01-01 NOTE — PRG
DATE OF SERVICE:  01/01/2018

 

DATE OF ADMISSION:  12/22/2017

 

HISTORY OF PRESENT ILLNESS:  Mr. Murray is a very pleasant 82-year-old white male that had infected lef
t prosthetic knee.  Infection was documented as Enterococcus and the patient had a functional spacer 
placed with antibiotics.  He was placed on daptomycin 1000 mg IV until further sensitivities returned
 from [facility].  He is nonweightbearing and was transferred to [facility] for cont
inued antibiotic care and physical therapy and occupational therapy.

 

The patient states he did well on therapy this morning and they worked him pretty hard.

 

SUBJECTIVE:  The patient has no complaints today.  His wife is in room, and I have answered all of he
r questions.

 

OBJECTIVE:

VITAL SIGNS:  Reveal blood pressure this morning 149/74, pulse 62-63, respirations 16-20, O2 sat 93% 
on 2 liters, T-max is 97.6.

GENERAL:  This is a well-developed, well-nourished, very pleasant, obese white male, in no apparent d
istress at this time.

HEENT:  Reveals normocephalic, nontraumatic cranium.  Pupils are equal, round, and reactive.  Extraoc
ular movements intact.  Nose and throat are slightly dry.

NECK:  Supple, without masses, nodes, or bruits.

LUNGS:  Chest clear to auscultation.  No rales, rhonchi, or wheezes are heard.  The patient does have
 a raspy cough today.

HEART:  Reveals a regular rate and rhythm without murmurs, gallops, or rubs.

ABDOMEN:  Obese, soft, nontender, without organomegaly, normal bowel sounds are noted.  No rebound or
 guarding is noted.

:  Exam is deferred.

EXTREMITIES:  Reveal left knee still in knee immobilizer with no swelling or redness or significant p
ain.

 

IMPRESSION:

1.  Enterococcus infection, left prosthetic knee with spacer and antibiotics.

2.  Diabetes, type 2.

3.  Obstructive sleep apnea.

4.  Diastolic heart failure.

5.  Santos's esophagitis.

6.  History of atrial fibrillation.

7.  Recurrent deep venous thrombosis.

8.  History of gastrointestinal ulcer with life-threatening gastrointestinal bleed in the distant pas
t.

9.  Generalized weakness.

10.  Occasional confusion.

 

PLAN:

1.  Continue daptomycin until discussed with Dr. Mack.

2.  Continue present medications.

3.  Decubitus precautions.

4.  Stress ulcer prophylaxis.

5.  DVT precautions.

6.  Continue present antibiotics.

7.  Continue present medication.

8.  Supportive care.

## 2018-01-02 RX ADMIN — INSULIN LISPRO PRN UNIT: 100 INJECTION, SOLUTION INTRAVENOUS; SUBCUTANEOUS at 20:33

## 2018-01-02 RX ADMIN — Medication SCH TAB: at 08:14

## 2018-01-02 RX ADMIN — ASPIRIN SCH MG: 81 TABLET ORAL at 08:14

## 2018-01-02 RX ADMIN — Medication PRN ML: at 14:32

## 2018-01-02 RX ADMIN — Medication SCH ML: at 20:33

## 2018-01-02 RX ADMIN — MULTIPLE VITAMINS W/ MINERALS TAB SCH TAB: TAB at 08:15

## 2018-01-02 RX ADMIN — Medication SCH ML: at 08:15

## 2018-01-02 RX ADMIN — ALOGLIPTIN SCH MG: 6.25 TABLET, FILM COATED ORAL at 08:13

## 2018-01-02 RX ADMIN — Medication SCH MG: at 08:13

## 2018-01-02 RX ADMIN — CYANOCOBALAMIN TAB 1000 MCG SCH MCG: 1000 TAB at 08:14

## 2018-01-02 NOTE — OP
DATE OF PROCEDURE:  01/22/2018

 

PREOPERATIVE DIAGNOSIS:  Failed left total knee.

 

POSTOPERATIVE DIAGNOSIS:  Failed left total knee.

 

PROCEDURE:  Revision left total knee arthroplasty.

 

SURGEON:  Dyllan Roberts M.D.

 

ASSISTANT:  Renzo Horne PA-C.

 

BLOOD LOSS:  Minimal.

 

SPECIMEN:  None.

 

DRAINS:  None.

 

COMPLICATIONS:  None.

 

Cultures were obtained, deep tissue.

 

PROCEDURE IN DETAIL:  The patient was taken to the operating where general anesthesia was induced.  L
eft leg was prepped and draped in the usual sterile fashion.  I opened up the old incision, it had a 
draining sinus in the knee.  This was excised.  I performed a complete synovectomy, had very thick ci
catrix throughout the knee.  Implants were removed carefully and cautiously with good preservation of
 bone stock.  I performed revision total knee using the primary total knee implants.  This was Stryke
r Triathlon femur with a Everardo Triathlon all poly tibia and the patella was not replaced.  Antibiot
ics were treated with gentamicin, and tobramycin.  After irrigation performed implants were cemented 
into place.  Additional irrigation was performed.  The knee was closed with #1 Vicryl and #1 Quill fo
r the deep layer, the subcu was closed with 2-0 Quill, skin was closed with 2-0 Prolene.  Sterile edward
ssing was applied.  There were no complications.

## 2018-01-03 RX ADMIN — Medication SCH TAB: at 09:02

## 2018-01-03 RX ADMIN — Medication PRN ML: at 14:37

## 2018-01-03 RX ADMIN — Medication PRN ML: at 14:00

## 2018-01-03 RX ADMIN — ASPIRIN SCH MG: 81 TABLET ORAL at 09:03

## 2018-01-03 RX ADMIN — Medication SCH ML: at 09:03

## 2018-01-03 RX ADMIN — CYANOCOBALAMIN TAB 1000 MCG SCH MCG: 1000 TAB at 09:02

## 2018-01-03 RX ADMIN — MULTIPLE VITAMINS W/ MINERALS TAB SCH TAB: TAB at 09:03

## 2018-01-03 RX ADMIN — Medication SCH MG: at 09:02

## 2018-01-03 RX ADMIN — ALOGLIPTIN SCH MG: 6.25 TABLET, FILM COATED ORAL at 09:02

## 2018-01-03 RX ADMIN — HYDROCODONE BITARTRATE AND ACETAMINOPHEN PRN TAB: 10; 325 TABLET ORAL at 18:53

## 2018-01-03 RX ADMIN — Medication SCH ML: at 20:09

## 2018-01-03 RX ADMIN — HYDROCODONE BITARTRATE AND ACETAMINOPHEN PRN TAB: 10; 325 TABLET ORAL at 23:05

## 2018-01-03 RX ADMIN — INSULIN LISPRO PRN UNIT: 100 INJECTION, SOLUTION INTRAVENOUS; SUBCUTANEOUS at 11:23

## 2018-01-03 NOTE — PRG
DATE OF SERVICE:  01/02/2018

 

SUBJECTIVE:  The patient feels well.  Still not sleeping at night, sleeping during the day, but not a
gitated.  Cooperated with therapy.  He is having no pain in his knee.

 

OBJECTIVE:

VITAL SIGNS:  Shows blood pressure is 134/72, temperature 95.9, pulse 63, respirations 20, O2 saturat
ion is 95%.

LUNGS:  Clear.

CARDIAC:  Examination shows regular rhythm.  No gallops or murmurs.

ABDOMEN:  Soft, nontender with no masses or organomegaly.

EXTREMITIES:  Left knee shows no erythema, warmth or tenderness.  Mild swelling.  Display no clubbing
 or cyanosis.

 

Objective shows wound is growing vancomycin-resistant Enterococcus and after discussion with Dr. Marcia dumont agreed to continue on daptomycin and this appears to be the best course and is very resistant organ
ism and continue for 42 days.

 

ASSESSMENT:

1.  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

2.  Stable type 2 diabetes.

3.  Obstructive sleep apnea.  Stable, fair compliance on CPAP.

4.  Diastolic heart failure, well compensated.

5.  Santos esophagus, asymptomatic.

 

PLAN:

1.  Continue daptomycin 10 mg daily.

2.  Continue Accu-Cheks and monitor control of diabetes mellitus.

3.  Obstructive sleep apnea controlled with CPAP.

4.  Continue treatment with diastolic pressure.

## 2018-01-04 RX ADMIN — Medication SCH MG: at 09:49

## 2018-01-04 RX ADMIN — Medication SCH ML: at 09:51

## 2018-01-04 RX ADMIN — ALOGLIPTIN SCH MG: 6.25 TABLET, FILM COATED ORAL at 09:50

## 2018-01-04 RX ADMIN — ASPIRIN SCH MG: 81 TABLET ORAL at 09:51

## 2018-01-04 RX ADMIN — Medication PRN ML: at 14:10

## 2018-01-04 RX ADMIN — Medication PRN ML: at 05:32

## 2018-01-04 RX ADMIN — INSULIN LISPRO PRN UNIT: 100 INJECTION, SOLUTION INTRAVENOUS; SUBCUTANEOUS at 17:07

## 2018-01-04 RX ADMIN — Medication SCH TAB: at 09:50

## 2018-01-04 RX ADMIN — MULTIPLE VITAMINS W/ MINERALS TAB SCH TAB: TAB at 09:50

## 2018-01-04 RX ADMIN — CYANOCOBALAMIN TAB 1000 MCG SCH MCG: 1000 TAB at 09:50

## 2018-01-04 RX ADMIN — Medication SCH ML: at 20:47

## 2018-01-04 RX ADMIN — INSULIN LISPRO PRN UNIT: 100 INJECTION, SOLUTION INTRAVENOUS; SUBCUTANEOUS at 12:12

## 2018-01-04 RX ADMIN — MAGNESIUM HYDROXIDE PRN ML: 400 SUSPENSION ORAL at 20:48

## 2018-01-05 RX ADMIN — ASPIRIN SCH MG: 81 TABLET ORAL at 08:59

## 2018-01-05 RX ADMIN — Medication PRN ML: at 05:31

## 2018-01-05 RX ADMIN — Medication SCH TAB: at 08:59

## 2018-01-05 RX ADMIN — HYDROCODONE BITARTRATE AND ACETAMINOPHEN PRN TAB: 10; 325 TABLET ORAL at 21:30

## 2018-01-05 RX ADMIN — Medication SCH ML: at 09:00

## 2018-01-05 RX ADMIN — CYANOCOBALAMIN TAB 1000 MCG SCH MCG: 1000 TAB at 08:59

## 2018-01-05 RX ADMIN — Medication SCH MG: at 10:12

## 2018-01-05 RX ADMIN — MULTIPLE VITAMINS W/ MINERALS TAB SCH TAB: TAB at 08:59

## 2018-01-05 RX ADMIN — Medication SCH ML: at 21:30

## 2018-01-05 RX ADMIN — ALOGLIPTIN SCH MG: 6.25 TABLET, FILM COATED ORAL at 08:59

## 2018-01-05 NOTE — PRG
DATE OF SERVICE:  01/05/2018

 

SUBJECTIVE:  The patient is lying in bed, resting well with no dyspnea, chest pain, nausea or vomitin
g.  He has been sleeping somewhat better according to his wife.  He has had no pain in his knee and h
as been cooperating with therapy.  He has appointment with orthopedic surgeon, Dr. Michael Wall in 3 
days to remove sutures.

 

OBJECTIVE:

VITAL SIGNS:  Shows blood pressure is 114/61, pulse 72, O2 sat 94%, temperature 96.5.

LUNGS:  Clear, decreased breath sounds in bases.

CARDIAC:  Shows an irregular rate and rhythm.

ABDOMEN:  Soft and nontender.

SKIN/EXTREMITIES:  Left knee shows healing anterior incision with no erythema, warmth, drainage, or s
welling.

 

ASSESSMENT:

1.  Resolving left prosthetic knee infection with VRE on Cubicin for a full 14 days.

2.  Mild hospital delirium, stable.

3.  Diastolic heart failure, well compensated.

4.  Obstructive sleep apnea, stable, plan daptomycin for full 42 days of therapy.

5.  Continue Accu-Cheks and monitor controlled diabetes mellitus.

6.  Continue stress and CPAP for obstructive sleep apnea.

7.   Obtain CBC, base met profile, CRP and sed rate in the a.m.

## 2018-01-06 LAB
ALBUMIN SERPL BCG-MCNC: 2.9 G/DL (ref 3.4–4.8)
ALP SERPL-CCNC: 125 U/L (ref 40–150)
ALT SERPL W P-5'-P-CCNC: 28 U/L (ref 8–55)
ANION GAP SERPL CALC-SCNC: 11 MMOL/L (ref 10–20)
AST SERPL-CCNC: 30 U/L (ref 5–34)
BASOPHILS # BLD AUTO: 0.2 THOU/UL (ref 0–0.2)
BASOPHILS NFR BLD AUTO: 1.3 % (ref 0–1)
BILIRUB SERPL-MCNC: 0.7 MG/DL (ref 0.2–1.2)
BUN SERPL-MCNC: 23 MG/DL (ref 8.4–25.7)
CALCIUM SERPL-MCNC: 9.3 MG/DL (ref 7.8–10.44)
CHLORIDE SERPL-SCNC: 104 MMOL/L (ref 98–107)
CO2 SERPL-SCNC: 27 MMOL/L (ref 23–31)
CREAT CL PREDICTED SERPL C-G-VRATE: 123 ML/MIN (ref 70–130)
CRP SERPL-MCNC: 2.93 MG/DL
EOSINOPHIL # BLD AUTO: 1.1 THOU/UL (ref 0–0.7)
EOSINOPHIL NFR BLD AUTO: 9.7 % (ref 0–10)
GLOBULIN SER CALC-MCNC: 3.8 G/DL (ref 2.4–3.5)
GLUCOSE SERPL-MCNC: 135 MG/DL (ref 83–110)
HGB BLD-MCNC: 12 G/DL (ref 14–18)
LYMPHOCYTES # BLD AUTO: 2.5 THOU/UL (ref 1.2–3.4)
LYMPHOCYTES NFR BLD AUTO: 21.3 % (ref 21–51)
MCH RBC QN AUTO: 27 PG (ref 27–31)
MCV RBC AUTO: 87.7 FL (ref 80–94)
MONOCYTES # BLD AUTO: 1.1 THOU/UL (ref 0.11–0.59)
MONOCYTES NFR BLD AUTO: 9.3 % (ref 0–10)
NEUTROPHILS # BLD AUTO: 6.7 THOU/UL (ref 1.4–6.5)
NEUTROPHILS NFR BLD AUTO: 58.4 % (ref 42–75)
PLATELET # BLD AUTO: 164 THOU/UL (ref 130–400)
POTASSIUM SERPL-SCNC: 4.2 MMOL/L (ref 3.5–5.1)
RBC # BLD AUTO: 4.43 MILL/UL (ref 4.7–6.1)
SODIUM SERPL-SCNC: 138 MMOL/L (ref 136–145)
WBC # BLD AUTO: 11.5 THOU/UL (ref 4.8–10.8)

## 2018-01-06 RX ADMIN — ALOGLIPTIN SCH MG: 6.25 TABLET, FILM COATED ORAL at 08:42

## 2018-01-06 RX ADMIN — CYANOCOBALAMIN TAB 1000 MCG SCH MCG: 1000 TAB at 08:43

## 2018-01-06 RX ADMIN — Medication SCH MG: at 08:42

## 2018-01-06 RX ADMIN — Medication SCH ML: at 08:43

## 2018-01-06 RX ADMIN — Medication PRN ML: at 05:07

## 2018-01-06 RX ADMIN — Medication SCH TAB: at 08:43

## 2018-01-06 RX ADMIN — INSULIN LISPRO PRN UNIT: 100 INJECTION, SOLUTION INTRAVENOUS; SUBCUTANEOUS at 12:17

## 2018-01-06 RX ADMIN — MAGNESIUM HYDROXIDE PRN ML: 400 SUSPENSION ORAL at 05:07

## 2018-01-06 RX ADMIN — Medication SCH ML: at 21:10

## 2018-01-06 RX ADMIN — MULTIPLE VITAMINS W/ MINERALS TAB SCH TAB: TAB at 08:43

## 2018-01-06 RX ADMIN — ASPIRIN SCH MG: 81 TABLET ORAL at 08:43

## 2018-01-06 RX ADMIN — MAGNESIUM HYDROXIDE PRN ML: 400 SUSPENSION ORAL at 12:32

## 2018-01-06 NOTE — PRG
DATE OF SERVICE:  01/06/2018

 

SUBJECTIVE:  Mr. Murray is doing well except for constipation.  He apparently has not had a bowel movem
ent since the first.  Denies any vomiting.  Denies any other concerns.  His wife is in the room.

 

OBJECTIVE:

VITAL SIGNS:  He is afebrile, heart rate 61, respirations 22, oxygen saturation 91% on 2 liters, bloo
d pressure 157/74.

CARDIOVASCULAR SYSTEM:  S1, S2 plus.

RESPIRATORY SYSTEM:  Normal vesicular breath sounds.

ABDOMEN:  Soft, obese, nontender, bowel sounds heard in all quadrants.

EXTREMITIES:  Without cyanosis or clubbing.

 

LABORATORY DATA:  White count is 11.5, hemoglobin and hematocrit is 12 and 38.9, sed rate is down to 
44.  Chemistry shows sodium of 138, potassium 4.2, BUN and creatinine 22 and 0.83.  Blood sugars are 
186, 145, 209.  CRP is pending.

 

IMPRESSION:

1.  Left prosthetic knee infection with VRE on Cubicin.

2.  Diastolic congestive heart failure, chronic, well compensated.

3.  Obstructive sleep apnea.

4.  Diabetes mellitus type 2.

5.  Constipation.

 

PLAN:

1.  Dulcolax suppository b.i.d. p.r.n.

2.  Fleet's enema p.r.n.

3.  Colace 100 mg b.i.d.

4.  A 1800 calorie heart healthy diet.

5.  Bowel regimen.

6.  DVT and stress ulcer prophylaxis.

7.  Decubitus precautions.

8.  Continue antibiotics.

9.  Discussed with patient and spouse in detail and all questions answered.

## 2018-01-07 RX ADMIN — INSULIN LISPRO PRN UNIT: 100 INJECTION, SOLUTION INTRAVENOUS; SUBCUTANEOUS at 12:33

## 2018-01-07 RX ADMIN — Medication SCH ML: at 09:21

## 2018-01-07 RX ADMIN — ALOGLIPTIN SCH MG: 6.25 TABLET, FILM COATED ORAL at 09:18

## 2018-01-07 RX ADMIN — Medication SCH ML: at 21:04

## 2018-01-07 RX ADMIN — MULTIPLE VITAMINS W/ MINERALS TAB SCH TAB: TAB at 09:20

## 2018-01-07 RX ADMIN — Medication SCH TAB: at 09:19

## 2018-01-07 RX ADMIN — Medication SCH MG: at 09:19

## 2018-01-07 RX ADMIN — Medication PRN ML: at 05:39

## 2018-01-07 RX ADMIN — CYANOCOBALAMIN TAB 1000 MCG SCH MCG: 1000 TAB at 09:19

## 2018-01-07 RX ADMIN — ASPIRIN SCH MG: 81 TABLET ORAL at 09:28

## 2018-01-07 NOTE — PRG
DATE OF SERVICE:  01/07/2018

 

SUBJECTIVE:  Mr. Murray is feeling much better.  He apparently had a significant impaction when nursing
 tried to give him an enema.  He was disimpacted.  He denies any nausea or vomiting.  He is toleratin
g his p.o. intake.  No other complaints.  No family at bedside.

 

OBJECTIVE:

VITAL SIGNS:  He is afebrile, heart rate 60, respirations 18, oxygen saturation 95% on 2 liters, and 
blood pressure is 127/76.

CARDIOVASCULAR SYSTEM:  S1, S2 plus.

RESPIRATORY SYSTEM:  Normal vesicular breath sounds.

ABDOMEN:  Soft, obese, nontender, bowel sounds heard in all quadrants.

EXTREMITIES:  Without cyanosis or clubbing.

CENTRAL NERVOUS SYSTEM:  Generalized weakness.

 

IMPRESSION:

1.  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

2.  Resolved constipation likely due to fecal impaction.

3.  Chronic diastolic congestive heart failure, well compensated.

4.  Obstructive sleep apnea.

5.  Diabetes mellitus type 2.

6.  Morbid obesity.

 

PLAN:

1.  Continue Colace 100 mg b.i.d.

2.  1800 calorie heart healthy diet.

3.  Accu-Cheks with sliding scale coverage.

4.  Deep venous thrombosis and stress ulcer prophylaxis.

5.  Decubitus precautions.

6.  Antibiotics.

7.  Routine laboratory values.

8.  Dr. Richard Torres will be back tonight.

## 2018-01-08 RX ADMIN — MULTIPLE VITAMINS W/ MINERALS TAB SCH TAB: TAB at 08:46

## 2018-01-08 RX ADMIN — ASPIRIN SCH MG: 81 TABLET ORAL at 08:46

## 2018-01-08 RX ADMIN — Medication PRN ML: at 14:07

## 2018-01-08 RX ADMIN — Medication SCH MG: at 08:45

## 2018-01-08 RX ADMIN — Medication SCH ML: at 08:46

## 2018-01-08 RX ADMIN — Medication SCH TAB: at 08:45

## 2018-01-08 RX ADMIN — HYDROCODONE BITARTRATE AND ACETAMINOPHEN PRN TAB: 10; 325 TABLET ORAL at 21:09

## 2018-01-08 RX ADMIN — Medication SCH ML: at 21:08

## 2018-01-08 RX ADMIN — Medication PRN ML: at 05:16

## 2018-01-08 RX ADMIN — CYANOCOBALAMIN TAB 1000 MCG SCH MCG: 1000 TAB at 08:45

## 2018-01-08 RX ADMIN — MAGNESIUM HYDROXIDE PRN ML: 400 SUSPENSION ORAL at 16:48

## 2018-01-08 RX ADMIN — ALOGLIPTIN SCH MG: 6.25 TABLET, FILM COATED ORAL at 08:45

## 2018-01-08 NOTE — PRG
DATE OF SERVICE:  01/08/2018

 

SUBJECTIVE:  The patient feels well.  No complaints, just weakness and dyspnea on exertion, no leg pa
in.  He is being transferred to his orthopedic surgeon, Dr. Wall, for evaluation of removals of sut
ures and for possible increase in weightbearing status.

 

OBJECTIVE:  

VITAL SIGNS:  Blood pressure 122/62, temperature 96, pulse 66, respirations 20, O2 sats 95%.

LUNGS:   Lungs are clear.  

CARDIAC: Cardiac examination shows irregular regular rhythm.

ABDOMEN:  Obese and nontender.

 

LABORATORY:  White count 11,500, hematocrit 38, hemoglobin 12.  Sed rate is 44.  Accu-Cheks ranged fr
om 130 to 187.  CRP is 2.93, greatly improved from previous 7.47.

 

Left knee shows anterior incision healing well with no erythema, warmth or drainage.  Lungs show decr
eased breath sounds.  Cardiac examination shows an irregular regular rhythm.

 

ASSESSMENT:

1.  Resolving VRE infection of left prosthetic knee on IV daptomycin.

2.  Morbid obesity.  

3.  Obstructive sleep apnea, stable.

4.  Hospital delirium, stable.

5.  Type 2 diabetes, controlled to goal.

 

PLAN:  Follow up with Dr. Wall today, continue IV daptomycin as sed rate and CRP are continuing to 
improve.  Continue Accu-Cheks with sliding scale coverage.

## 2018-01-09 RX ADMIN — Medication SCH ML: at 20:53

## 2018-01-09 RX ADMIN — ALOGLIPTIN SCH MG: 6.25 TABLET, FILM COATED ORAL at 08:36

## 2018-01-09 RX ADMIN — Medication SCH TAB: at 08:36

## 2018-01-09 RX ADMIN — INSULIN LISPRO PRN UNIT: 100 INJECTION, SOLUTION INTRAVENOUS; SUBCUTANEOUS at 11:48

## 2018-01-09 RX ADMIN — Medication PRN ML: at 15:00

## 2018-01-09 RX ADMIN — Medication SCH MG: at 08:36

## 2018-01-09 RX ADMIN — CYANOCOBALAMIN TAB 1000 MCG SCH MCG: 1000 TAB at 08:36

## 2018-01-09 RX ADMIN — Medication PRN ML: at 05:36

## 2018-01-09 RX ADMIN — ASPIRIN SCH MG: 81 TABLET ORAL at 08:36

## 2018-01-09 RX ADMIN — Medication SCH ML: at 08:37

## 2018-01-09 RX ADMIN — MULTIPLE VITAMINS W/ MINERALS TAB SCH TAB: TAB at 08:35

## 2018-01-09 NOTE — PRG
DATE OF SERVICE:  01/09/2018

 

SUBJECTIVE:  The patient feels well with no complaints of pain in his left knee.  No shortness of apple
ath.  He states he slept well last night.  Decrease confusion.  No chest pain, no cough, no fever or 
chills.

 

OBJECTIVE:  Orthopedic consultation follow up with Dr. Wall yesterday stated the patient has been r
emoved from knee immobilizer and started on weightbearing as tolerated and stable and routine total k
nee precautions.

 

EXTREMITIES:  Left knee appears to be healing well with no evidence of a chronic infection with no er
ythema, warmth or tenderness.

LUNGS:  Clear with decreased breath sounds diffusely.  

CARDIAC:  Cardiac examination shows an irregularly irregular rhythm.

ABDOMEN:  Obese and nontender.

 

ASSESSMENT:

1.  Resolving infection of left prosthetic knee on IV daptomycin and meropenem.  We will discontinue 
meropenem and continue daptomycin for a full 6-week course.

2.  Morbid obesity.

3.  Obstructive sleep apnea, stable.

4.  Hospital delirium, possibly slightly improved.

5.  Type 2 diabetes.

 

PLAN:  

1.  Change physical therapy to total knee precautions, weightbearing as tolerated and discontinue kne
e immobilizer.  

2.  Discontinue meropenem.

3.  Discontinue Hudson and do bladder scans to evaluate for retention as patient is moving more freque
ntly.

## 2018-01-10 RX ADMIN — Medication SCH ML: at 21:02

## 2018-01-10 RX ADMIN — CYANOCOBALAMIN TAB 1000 MCG SCH MCG: 1000 TAB at 09:13

## 2018-01-10 RX ADMIN — Medication SCH MG: at 09:12

## 2018-01-10 RX ADMIN — ASPIRIN SCH MG: 81 TABLET ORAL at 09:13

## 2018-01-10 RX ADMIN — ALOGLIPTIN SCH MG: 6.25 TABLET, FILM COATED ORAL at 09:12

## 2018-01-10 RX ADMIN — MAGNESIUM HYDROXIDE PRN ML: 400 SUSPENSION ORAL at 21:02

## 2018-01-10 RX ADMIN — MULTIPLE VITAMINS W/ MINERALS TAB SCH TAB: TAB at 09:14

## 2018-01-10 RX ADMIN — Medication SCH ML: at 09:14

## 2018-01-10 RX ADMIN — Medication SCH TAB: at 09:13

## 2018-01-10 NOTE — PRG
DATE OF SERVICE:  01/10/2018

 

SUBJECTIVE:  The patient is sleeping and resting well with decreased agitation, cooperating with ther
apy, and is having no pain in the left knee.

 

OBJECTIVE:

VITAL SIGNS:  Shows temperature is 98.4, pulse 72, respirations 20, O2 sats is 97% on 2 liters, blood
 pressure 172/68.

LUNGS:  Clear with decreased breath sounds in the bases.

CARDIAC EXAMINATION:  Shows an irregular rhythm.

ABDOMEN:  Obese, nontender.

SKIN AND EXTREMITIES:  Show left knee healing well.  No erythema or warmth or tenderness.

 

ASSESSMENT:

1.  Resolving vancomycin-resistant enterococci infection of left total knee with recent followup with
 Orthopedics, and has been placed on routine total knee protocol, partial weightbearing as tolerated 
with no immobilizer.

2.  Chronic atrial fibrillation with rate control, on anticoagulation.

3.  Obstructive sleep apnea, noncompliance to CPAP, but on chronic oxygen.

4.  Hospital delirium, improved.

5.  Type 2 diabetes, improved.

6.  Morbid obesity.

7.  Urinary retention with inability to tolerate discontinuation slowly with greater than 500 mL rete
ntion and has been replaced.

 

PLAN:

1.  Continue PT with a total knee protocol.

2.  Continue Cubicin and repeat inflammatory markers.

3.  Continue Hudson and we will start on Avodart and in addition to tamsulosin to hopefully decrease u
rinary obstruction.

## 2018-01-11 LAB
ALBUMIN SERPL BCG-MCNC: 3 G/DL (ref 3.4–4.8)
ALP SERPL-CCNC: 117 U/L (ref 40–150)
ALT SERPL W P-5'-P-CCNC: 26 U/L (ref 8–55)
ANION GAP SERPL CALC-SCNC: 12 MMOL/L (ref 10–20)
AST SERPL-CCNC: 30 U/L (ref 5–34)
BASOPHILS # BLD AUTO: 0.1 THOU/UL (ref 0–0.2)
BASOPHILS NFR BLD AUTO: 1.2 % (ref 0–1)
BILIRUB SERPL-MCNC: 0.7 MG/DL (ref 0.2–1.2)
BUN SERPL-MCNC: 25 MG/DL (ref 8.4–25.7)
CALCIUM SERPL-MCNC: 9.3 MG/DL (ref 7.8–10.44)
CHLORIDE SERPL-SCNC: 105 MMOL/L (ref 98–107)
CO2 SERPL-SCNC: 25 MMOL/L (ref 23–31)
CREAT CL PREDICTED SERPL C-G-VRATE: 120 ML/MIN (ref 70–130)
CRP SERPL-MCNC: 2.82 MG/DL
EOSINOPHIL # BLD AUTO: 1 THOU/UL (ref 0–0.7)
EOSINOPHIL NFR BLD AUTO: 8.4 % (ref 0–10)
GLOBULIN SER CALC-MCNC: 3.9 G/DL (ref 2.4–3.5)
GLUCOSE SERPL-MCNC: 137 MG/DL (ref 83–110)
HGB BLD-MCNC: 12.4 G/DL (ref 14–18)
LYMPHOCYTES # BLD AUTO: 2.7 THOU/UL (ref 1.2–3.4)
LYMPHOCYTES NFR BLD AUTO: 22.7 % (ref 21–51)
MCH RBC QN AUTO: 27 PG (ref 27–31)
MCV RBC AUTO: 86 FL (ref 80–94)
MDIFF COMPLETE?: YES
MONOCYTES # BLD AUTO: 1.2 THOU/UL (ref 0.11–0.59)
MONOCYTES NFR BLD AUTO: 10.2 % (ref 0–10)
NEUTROPHILS # BLD AUTO: 7 THOU/UL (ref 1.4–6.5)
NEUTROPHILS NFR BLD AUTO: 57.6 % (ref 42–75)
PLATELET # BLD AUTO: 173 THOU/UL (ref 130–400)
PLATELET BLD QL SMEAR: (no result)
POTASSIUM SERPL-SCNC: 4.4 MMOL/L (ref 3.5–5.1)
RBC # BLD AUTO: 4.59 MILL/UL (ref 4.7–6.1)
SODIUM SERPL-SCNC: 138 MMOL/L (ref 136–145)
WBC # BLD AUTO: 12.1 THOU/UL (ref 4.8–10.8)

## 2018-01-11 RX ADMIN — INSULIN LISPRO PRN UNIT: 100 INJECTION, SOLUTION INTRAVENOUS; SUBCUTANEOUS at 20:44

## 2018-01-11 RX ADMIN — NYSTATIN SCH APPLIC: 100000 POWDER TOPICAL at 20:47

## 2018-01-11 RX ADMIN — MULTIPLE VITAMINS W/ MINERALS TAB SCH TAB: TAB at 08:27

## 2018-01-11 RX ADMIN — ASPIRIN SCH MG: 81 TABLET ORAL at 08:26

## 2018-01-11 RX ADMIN — Medication SCH TAB: at 08:26

## 2018-01-11 RX ADMIN — Medication SCH ML: at 20:44

## 2018-01-11 RX ADMIN — CYANOCOBALAMIN TAB 1000 MCG SCH MCG: 1000 TAB at 08:26

## 2018-01-11 RX ADMIN — Medication SCH ML: at 08:27

## 2018-01-11 RX ADMIN — Medication SCH MG: at 08:25

## 2018-01-11 RX ADMIN — ALOGLIPTIN SCH MG: 6.25 TABLET, FILM COATED ORAL at 08:25

## 2018-01-11 NOTE — PRG
DATE OF SERVICE:  01/11/2018

 

SUBJECTIVE:  The patient is lying in the bed, resting well.  Cooperating well with therapy.  No compl
aints of shortness of breath, chest pain, nausea, vomiting.  Still unable to pass his urine and has c
hronic Hudson in place.

 

OBJECTIVE:  He did walk 48 feet yesterday with a rolling walker with standby assistance, was able to 
assist with transfers.  

VITAL SIGNS:  Vital signs showed him to have blood pressure 141/77, O2 sats 96% on 2 liters, temperat
ure 96, pulse 56, respirations 20.

LUNGS:  Show decreased breath sounds in the bases.  No rales or rhonchi.

CARDIAC:  Shows an irregularly irregular rhythm, no gallops or murmurs.

 

LABORATORY:  Shows sodium 138, potassium 4.4, chloride 105, bicarb 25, BUN 25, creatinine 1.85, calci
um 9.3, total bilirubin 0.7, albumin 3.0.  Sed rate is down to 41, hematocrit 39, hemoglobin 12, whit
e count 12,100.

 

ASSESSMENT:

1.  Resolving VRE infection of left prosthetic knee on day 20 of 42 days of IV daptomycin.

2.  Atrial fibrillation with rate control on anticoagulation.

3.  Obstructive sleep apnea, stable with mild oxygen requirements.

4.  Morbid obesity, stable.

5.  Type 2 diabetes, controlled to goal.

6.  Chronic urinary retention on tamsulosin and now on Avodart with Hudson catheter until more ambulat
ory.

## 2018-01-12 RX ADMIN — ALOGLIPTIN SCH MG: 6.25 TABLET, FILM COATED ORAL at 09:38

## 2018-01-12 RX ADMIN — Medication SCH MG: at 09:36

## 2018-01-12 RX ADMIN — INSULIN LISPRO PRN UNIT: 100 INJECTION, SOLUTION INTRAVENOUS; SUBCUTANEOUS at 20:51

## 2018-01-12 RX ADMIN — NYSTATIN SCH APPLIC: 100000 POWDER TOPICAL at 09:39

## 2018-01-12 RX ADMIN — Medication SCH ML: at 09:37

## 2018-01-12 RX ADMIN — Medication SCH ML: at 20:50

## 2018-01-12 RX ADMIN — NYSTATIN SCH APPLIC: 100000 POWDER TOPICAL at 20:52

## 2018-01-12 RX ADMIN — CYANOCOBALAMIN TAB 1000 MCG SCH MCG: 1000 TAB at 09:37

## 2018-01-12 RX ADMIN — ASPIRIN SCH MG: 81 TABLET ORAL at 09:38

## 2018-01-12 RX ADMIN — Medication SCH TAB: at 09:38

## 2018-01-12 RX ADMIN — MULTIPLE VITAMINS W/ MINERALS TAB SCH TAB: TAB at 09:37

## 2018-01-13 RX ADMIN — Medication SCH ML: at 21:19

## 2018-01-13 RX ADMIN — Medication SCH TAB: at 08:21

## 2018-01-13 RX ADMIN — ALOGLIPTIN SCH MG: 6.25 TABLET, FILM COATED ORAL at 08:20

## 2018-01-13 RX ADMIN — Medication SCH ML: at 08:20

## 2018-01-13 RX ADMIN — ASPIRIN SCH MG: 81 TABLET ORAL at 08:21

## 2018-01-13 RX ADMIN — MULTIPLE VITAMINS W/ MINERALS TAB SCH TAB: TAB at 08:21

## 2018-01-13 RX ADMIN — Medication SCH MG: at 08:20

## 2018-01-13 RX ADMIN — MAGNESIUM HYDROXIDE PRN ML: 400 SUSPENSION ORAL at 05:43

## 2018-01-13 RX ADMIN — CYANOCOBALAMIN TAB 1000 MCG SCH MCG: 1000 TAB at 08:21

## 2018-01-13 RX ADMIN — NYSTATIN SCH APPLIC: 100000 POWDER TOPICAL at 21:19

## 2018-01-13 RX ADMIN — NYSTATIN SCH APPLIC: 100000 POWDER TOPICAL at 08:22

## 2018-01-13 RX ADMIN — Medication PRN ML: at 13:59

## 2018-01-13 RX ADMIN — INSULIN LISPRO PRN UNIT: 100 INJECTION, SOLUTION INTRAVENOUS; SUBCUTANEOUS at 11:46

## 2018-01-13 NOTE — PRG
DATE OF SERVICE:  01/13/2018

 

DATE OF ADMISSION:  11/22/2017

 

HISTORY OF PRESENT ILLNESS:  Mr. Murray is a pleasant 82-year-old white male.  He had an infected left 
prosthetic knee with Enterococcus.  He had a surgical debridement done and functional spatial placed 
with antibiotics.  He was placed on daptomycin 1000 mg IV.  He is nonweightbearing and was transferre
d to [facility] for continued antibiotic care, physical therapy and occupational ther
apy.

 

The patient states he is doing very well this morning, he has no complaints.  He states he enjoyed hi
s breakfast and ate all of it.

 

PHYSICAL EXAMINATION:

VITAL SIGNS:  Reveal blood pressure this morning 140/80, pulse 60, respirations 18, O2 sat 97% on 2 l
iters, T-max 98 degrees.

GENERAL:  This is a well-developed, well-nourished, very pleasant white male in no apparent distress 
at this time.

HEENT:  Reveals normocephalic and nontraumatic cranium.  Pupils are equally round and reactive.  Extr
aocular movements intact.  Nose and throat are slightly dry, but clear.

NECK:  Supple, without masses, nodes or bruits.

CHEST:  Clear to auscultation.  No rales, rhonchi or wheezes are heard.  Patient's cough is gone.

HEART:  Reveals a regular rate and rhythm without murmurs, gallops or rubs.

ABDOMEN:  Obese, soft, nontender without organomegaly.  Normal bowel sounds are noted.  No rebound or
 guarding is noted.

GENITOURINARY:  Deferred.

MUSCULOSKELETAL:  Left knee reveals immobilizers gone.  Some slight redness around where the sutures 
were, most of the scabbing is gone, no significant pain is noted.

 

IMPRESSION:

1.  Enterococcus infection of left prosthetic knee with spacer antibiotics.

2.  Diabetes type 2.

3.  Obstructive sleep apnea.

4.  Diastolic heart failure.

5.  Santos's esophagitis.

6.  History of atrial fibrillation.

7.  Recurrent deep venous thrombosis.

8.  History of gastrointestinal ulcer with life threatening gastrointestinal bleed in the distant pas
t.

9.  Generalized weakness.

10.  Occasional confusion.

 

PLAN:

1.  We will continue antibiotics per Dr. Torres.

2.  Continue present meds.

3.  Decubitus precautions.

4.  Stress ulcer prophylaxis.

5.  DVT precautions.

6.  Supportive care.

## 2018-01-14 RX ADMIN — CYANOCOBALAMIN TAB 1000 MCG SCH MCG: 1000 TAB at 08:51

## 2018-01-14 RX ADMIN — Medication PRN ML: at 14:06

## 2018-01-14 RX ADMIN — ALOGLIPTIN SCH MG: 6.25 TABLET, FILM COATED ORAL at 08:51

## 2018-01-14 RX ADMIN — MULTIPLE VITAMINS W/ MINERALS TAB SCH TAB: TAB at 08:50

## 2018-01-14 RX ADMIN — Medication SCH TAB: at 08:51

## 2018-01-14 RX ADMIN — ASPIRIN SCH MG: 81 TABLET ORAL at 08:52

## 2018-01-14 RX ADMIN — Medication SCH ML: at 20:36

## 2018-01-14 RX ADMIN — INSULIN LISPRO PRN UNIT: 100 INJECTION, SOLUTION INTRAVENOUS; SUBCUTANEOUS at 12:03

## 2018-01-14 RX ADMIN — INSULIN LISPRO PRN UNIT: 100 INJECTION, SOLUTION INTRAVENOUS; SUBCUTANEOUS at 20:36

## 2018-01-14 RX ADMIN — Medication SCH MG: at 08:50

## 2018-01-14 RX ADMIN — NYSTATIN SCH APPLIC: 100000 POWDER TOPICAL at 20:35

## 2018-01-14 RX ADMIN — NYSTATIN SCH APPLIC: 100000 POWDER TOPICAL at 10:30

## 2018-01-14 RX ADMIN — MAGNESIUM HYDROXIDE PRN ML: 400 SUSPENSION ORAL at 06:20

## 2018-01-14 RX ADMIN — Medication SCH ML: at 08:54

## 2018-01-14 NOTE — PRG
DATE OF SERVICE:  01/14/2018

 

SUBJECTIVE:  Mr. Murray is a very pleasant 82-year-old white male with an infected left prosthetic knee
 with Enterococcus.  Surgical debridement was done and patient had specially placed antibiotics.  He 
is on daptomycin 1000 mg IV.  He is nonweightbearing and was transferred to [facility] for his continued physical therapy, occupational therapy, and antibiotic care.  He is being followe
d by Dr. Torres.

 

The patient states he is doing well this morning.  He is waiting for his breakfast this morning.  He 
had a great day and looking forward to lying in bed watching football today.

 

PHYSICAL EXAMINATION:

GENERAL:  This is a well-developed, well-nourished, very pleasant white male in no apparent distress 
at this time.

VITAL SIGNS:  This morning reveal blood pressure 134/64, pulse 60, respirations 18-22, O2 sat 95%-97%
 on 2 liters, and T-max 97.5.

HEENT:  Reveals normocephalic, nontraumatic cranium.  Pupils are equally round and reactive.  Extraoc
ular movements intact.  Nose and throat are slightly dry.

NECK:  Supple, without masses, nodes or bruits.

LUNGS:  Chest is clear to auscultation.  No rales, rhonchi or wheezes are heard.  The patient has no 
cough.

CARDIOVASCULAR:  Reveals a regular rate and rhythm without murmurs, gallops or rubs.

ABDOMEN:  Obese, soft, nontender, without organomegaly.  Normal bowel sounds are noted.  No rebound o
r guarding is noted.

GENITOURINARY:  Deferred.

EXTREMITIES:  Reveal no clubbing, cyanosis or edema.  The left leg is still healing well, slightly wa
rm, but not really red.  No suture removing, no significant pain.

 

IMPRESSION:

1.  Enterococcus urinary tract infection prosthetic knee with spacer antibiotics and IV antibiotics w
ith Enterococcus.

2.  Diabetes type 2, which is well controlled.

3.  Obstructive sleep apnea.

4.  Diastolic heart failure.

5.  Santos esophagitis.

6.  History of atrial fibrillation.

7.  Recurrent deep venous thrombosis.

8.  History of gastrointestinal ulcer with life threatening gastrointestinal bleeds in the past.

9.  Occasional confusion.

10.  Generalized weakness.

 

PLAN:

1.  Continue antibiotics per Dr. Torres.

2.  Continue present medications.

3.  Decubitus precautions.

4.  Stress ulcer prophylaxis.

5.  DVT precautions.

6.  Physical therapy and occupational therapy.

7.  Supportive care.

## 2018-01-15 RX ADMIN — CYANOCOBALAMIN TAB 1000 MCG SCH MCG: 1000 TAB at 08:39

## 2018-01-15 RX ADMIN — ALOGLIPTIN SCH MG: 6.25 TABLET, FILM COATED ORAL at 08:37

## 2018-01-15 RX ADMIN — ASPIRIN SCH MG: 81 TABLET ORAL at 08:38

## 2018-01-15 RX ADMIN — MULTIPLE VITAMINS W/ MINERALS TAB SCH TAB: TAB at 08:40

## 2018-01-15 RX ADMIN — INSULIN LISPRO PRN UNIT: 100 INJECTION, SOLUTION INTRAVENOUS; SUBCUTANEOUS at 20:39

## 2018-01-15 RX ADMIN — Medication SCH ML: at 20:40

## 2018-01-15 RX ADMIN — Medication SCH MG: at 08:38

## 2018-01-15 RX ADMIN — MAGNESIUM HYDROXIDE PRN ML: 400 SUSPENSION ORAL at 08:49

## 2018-01-15 RX ADMIN — Medication SCH TAB: at 08:38

## 2018-01-15 RX ADMIN — NYSTATIN SCH APPLIC: 100000 POWDER TOPICAL at 08:40

## 2018-01-15 RX ADMIN — Medication SCH ML: at 08:41

## 2018-01-15 RX ADMIN — NYSTATIN SCH APPLIC: 100000 POWDER TOPICAL at 20:40

## 2018-01-15 NOTE — PRG
DATE OF SERVICE:  01/15/2018

 

SUBJECTIVE:  The patient feels well, lying in bed, visiting with wife, has been cooperating well with
 therapy, sleeping well at night.  No pain.  Decreased insomnia, confusion and no dyspnea at rest.

 

OBJECTIVE:

VITAL SIGNS:  Shows temperature is 96.9, pulse 60, respirations 20, O2 sats 94% on 2 liters and blood
 pressure 143/83.

LUNGS:  Show decreased breath sounds in bases.

CARDIAC:  Examination shows an irregular regular rhythm.

ABDOMEN:  Morbidly obese.

SKIN AND EXTREMITIES:  Left knee shows no erythema, warmth or tenderness.  Skin show no edema, clubbi
ng, cyanosis.

 

ASSESSMENT:

1.  Resolving VRE infection of left prosthetic knee on day 24, 42 days of IV daptomycin.

2.  Atrial fibrillation with rate control, on anticoagulation.

3.  Obstructive sleep apnea.

4.  Morbid obesity, requiring mild oxygen requirements at night at 1-2 liters.

5.  Type 2 diabetes, controlled to goal.

6.  Chronic urinary retention, on tamsulosin and Avodart, but still requiring Hudson.

 

PLAN:  Repeat CBC, sed rate and comp metabolic profile in the a.m.

## 2018-01-16 LAB
ALBUMIN SERPL BCG-MCNC: 2.9 G/DL (ref 3.4–4.8)
ALP SERPL-CCNC: 110 U/L (ref 40–150)
ALT SERPL W P-5'-P-CCNC: 31 U/L (ref 8–55)
ANION GAP SERPL CALC-SCNC: 12 MMOL/L (ref 10–20)
AST SERPL-CCNC: 35 U/L (ref 5–34)
BASOPHILS # BLD AUTO: 0.1 THOU/UL (ref 0–0.2)
BASOPHILS NFR BLD AUTO: 1.2 % (ref 0–1)
BILIRUB SERPL-MCNC: 0.6 MG/DL (ref 0.2–1.2)
BUN SERPL-MCNC: 19 MG/DL (ref 8.4–25.7)
CALCIUM SERPL-MCNC: 9.3 MG/DL (ref 7.8–10.44)
CHLORIDE SERPL-SCNC: 108 MMOL/L (ref 98–107)
CO2 SERPL-SCNC: 21 MMOL/L (ref 23–31)
CREAT CL PREDICTED SERPL C-G-VRATE: 117 ML/MIN (ref 70–130)
EOSINOPHIL # BLD AUTO: 1.1 THOU/UL (ref 0–0.7)
EOSINOPHIL NFR BLD AUTO: 10.3 % (ref 0–10)
GLOBULIN SER CALC-MCNC: 3.8 G/DL (ref 2.4–3.5)
GLUCOSE SERPL-MCNC: 119 MG/DL (ref 83–110)
HGB BLD-MCNC: 12.1 G/DL (ref 14–18)
LYMPHOCYTES # BLD AUTO: 2.6 THOU/UL (ref 1.2–3.4)
LYMPHOCYTES NFR BLD AUTO: 23.3 % (ref 21–51)
MCH RBC QN AUTO: 27.3 PG (ref 27–31)
MCV RBC AUTO: 87.4 FL (ref 80–94)
MONOCYTES # BLD AUTO: 1.1 THOU/UL (ref 0.11–0.59)
MONOCYTES NFR BLD AUTO: 10.3 % (ref 0–10)
NEUTROPHILS # BLD AUTO: 6 THOU/UL (ref 1.4–6.5)
NEUTROPHILS NFR BLD AUTO: 54.8 % (ref 42–75)
PLATELET # BLD AUTO: 139 THOU/UL (ref 130–400)
POTASSIUM SERPL-SCNC: 4.4 MMOL/L (ref 3.5–5.1)
RBC # BLD AUTO: 4.45 MILL/UL (ref 4.7–6.1)
SODIUM SERPL-SCNC: 137 MMOL/L (ref 136–145)
WBC # BLD AUTO: 10.9 THOU/UL (ref 4.8–10.8)

## 2018-01-16 RX ADMIN — Medication SCH ML: at 08:42

## 2018-01-16 RX ADMIN — MULTIPLE VITAMINS W/ MINERALS TAB SCH TAB: TAB at 08:42

## 2018-01-16 RX ADMIN — ALOGLIPTIN SCH MG: 6.25 TABLET, FILM COATED ORAL at 08:39

## 2018-01-16 RX ADMIN — Medication SCH MG: at 08:40

## 2018-01-16 RX ADMIN — NYSTATIN SCH APPLIC: 100000 POWDER TOPICAL at 20:31

## 2018-01-16 RX ADMIN — Medication SCH TAB: at 08:41

## 2018-01-16 RX ADMIN — ASPIRIN SCH MG: 81 TABLET ORAL at 08:40

## 2018-01-16 RX ADMIN — Medication SCH ML: at 20:33

## 2018-01-16 RX ADMIN — CYANOCOBALAMIN TAB 1000 MCG SCH MCG: 1000 TAB at 08:41

## 2018-01-16 RX ADMIN — NYSTATIN SCH APPLIC: 100000 POWDER TOPICAL at 08:42

## 2018-01-17 RX ADMIN — NYSTATIN SCH APPLIC: 100000 POWDER TOPICAL at 09:07

## 2018-01-17 RX ADMIN — ALOGLIPTIN SCH MG: 6.25 TABLET, FILM COATED ORAL at 09:02

## 2018-01-17 RX ADMIN — INSULIN LISPRO PRN UNIT: 100 INJECTION, SOLUTION INTRAVENOUS; SUBCUTANEOUS at 12:07

## 2018-01-17 RX ADMIN — ASPIRIN SCH MG: 81 TABLET ORAL at 09:04

## 2018-01-17 RX ADMIN — Medication SCH MG: at 09:03

## 2018-01-17 RX ADMIN — NYSTATIN SCH APPLIC: 100000 POWDER TOPICAL at 20:18

## 2018-01-17 RX ADMIN — CYANOCOBALAMIN TAB 1000 MCG SCH MCG: 1000 TAB at 09:03

## 2018-01-17 RX ADMIN — MAGNESIUM HYDROXIDE PRN ML: 400 SUSPENSION ORAL at 09:01

## 2018-01-17 RX ADMIN — Medication PRN ML: at 14:01

## 2018-01-17 RX ADMIN — Medication SCH ML: at 09:05

## 2018-01-17 RX ADMIN — MULTIPLE VITAMINS W/ MINERALS TAB SCH TAB: TAB at 09:03

## 2018-01-17 RX ADMIN — Medication SCH ML: at 20:19

## 2018-01-17 RX ADMIN — Medication SCH TAB: at 09:02

## 2018-01-18 RX ADMIN — NYSTATIN SCH APPLIC: 100000 POWDER TOPICAL at 20:31

## 2018-01-18 RX ADMIN — MAGNESIUM HYDROXIDE PRN ML: 400 SUSPENSION ORAL at 05:19

## 2018-01-18 RX ADMIN — Medication PRN ML: at 14:07

## 2018-01-18 RX ADMIN — Medication SCH ML: at 08:40

## 2018-01-18 RX ADMIN — Medication SCH MG: at 08:37

## 2018-01-18 RX ADMIN — MULTIPLE VITAMINS W/ MINERALS TAB SCH TAB: TAB at 08:39

## 2018-01-18 RX ADMIN — ASPIRIN SCH MG: 81 TABLET ORAL at 08:39

## 2018-01-18 RX ADMIN — Medication SCH ML: at 20:31

## 2018-01-18 RX ADMIN — HYDROCODONE BITARTRATE AND ACETAMINOPHEN PRN TAB: 10; 325 TABLET ORAL at 20:32

## 2018-01-18 RX ADMIN — INSULIN LISPRO PRN UNIT: 100 INJECTION, SOLUTION INTRAVENOUS; SUBCUTANEOUS at 11:55

## 2018-01-18 RX ADMIN — NYSTATIN SCH APPLIC: 100000 POWDER TOPICAL at 08:40

## 2018-01-18 RX ADMIN — Medication SCH TAB: at 08:37

## 2018-01-18 RX ADMIN — CYANOCOBALAMIN TAB 1000 MCG SCH MCG: 1000 TAB at 08:38

## 2018-01-18 RX ADMIN — ALOGLIPTIN SCH MG: 6.25 TABLET, FILM COATED ORAL at 08:39

## 2018-01-18 NOTE — PRG
DATE OF SERVICE:  01/17/2018

 

SUBJECTIVE:  The patient lying in the bed, resting well with no complaints, chest pain, shortness apple
ath, knee pain, cooperating well with therapy, sleeping well at night.

 

OBJECTIVE:  Shows temperature 95.5, pulse 63, respirations 20, O2 sats 97%, and blood pressure 134/75
.

 

LABORATORY DATA:  Laboratory show white count is down 10,900, hematocrit 38, hemoglobin 12.  Sed rate
 down to 29.  Sodium 137, potassium 4.4, chloride 108, bicarbonate 21, BUN 19, creatinine 0.87, AST 3
5, ALT 31.  Left knee shows minimal warmth, no tenderness, erythema with improved range of motion.  P
hysical therapy states that patient is walking 25 feet x3.  Complete guard assist and appeared to be 
making progress, but is not to go yet.  No evidence of decompensation of cardiac pulmonary function d
uring therapy.

 

ASSESSMENT:  Resolving vancomycin-resistant enterococci infection of left prosthetic knee on IV Rocep
hin until 02/03/2018.

 

PLAN:  Continue PT, OT.

## 2018-01-19 RX ADMIN — ASPIRIN SCH MG: 81 TABLET ORAL at 08:54

## 2018-01-19 RX ADMIN — MULTIPLE VITAMINS W/ MINERALS TAB SCH TAB: TAB at 08:54

## 2018-01-19 RX ADMIN — ALOGLIPTIN SCH MG: 6.25 TABLET, FILM COATED ORAL at 08:54

## 2018-01-19 RX ADMIN — Medication SCH TAB: at 08:54

## 2018-01-19 RX ADMIN — NYSTATIN SCH APPLIC: 100000 POWDER TOPICAL at 20:53

## 2018-01-19 RX ADMIN — HYDROCODONE BITARTRATE AND ACETAMINOPHEN PRN TAB: 10; 325 TABLET ORAL at 20:51

## 2018-01-19 RX ADMIN — NYSTATIN SCH APPLIC: 100000 POWDER TOPICAL at 08:57

## 2018-01-19 RX ADMIN — Medication SCH MG: at 08:54

## 2018-01-19 RX ADMIN — Medication SCH ML: at 20:52

## 2018-01-19 RX ADMIN — Medication SCH ML: at 08:55

## 2018-01-19 RX ADMIN — CYANOCOBALAMIN TAB 1000 MCG SCH MCG: 1000 TAB at 08:53

## 2018-01-19 NOTE — PRG
DATE OF SERVICE:  01/19/2018

 

SUBJECTIVE:  The patient feels well with no complaints.  Ready to do more therapy, slept well, good a
ppetite, no pain in his knee.

 

ASSESSMENT:  Left knee shows no erythema, warmth, tenderness on palpation.

 

OBJECTIVE:

ABDOMEN:  Soft, nontender.

LUNGS:  Clear.

CARDIAC:  Shows regular rhythm.

VITAL SIGNS:  Shows temperature 95.6, pulse 64, respirations 20, O2 on 2 liters with 95% sat, and blo
od pressure 129/72.

LUNGS:  Clear.

CARDIAC:  Shows an irregularly irregular rhythm.

ABDOMEN:  Obese.

EXTREMITIES:  Left leg shows no erythema, warmth, or tenderness of the knee and healing incision.

 

ASSESSMENT:

1.  Resolving VRE infection of left prosthetic knee on 42 days of daptomycin.

2.  Stable morbid obesity.

3.  Stable obstructive sleep apnea.

4.  Stable atrial fibrillation with rate control, but no anticoagulation.

5.  Benign prostatic hypertrophy, improving on Flomax.

 

PLAN:  Discontinue Hudson today and monitor urine retention and output.  Continue PT, OT.  Continue IV
 daptomycin.

## 2018-01-19 NOTE — PRG
DATE OF SERVICE:  01/18/2018

 

SUBJECTIVE:  The patient feels well with increasing strength.  No pain in his leg.  No shortness of b
reath.  No confusion or palpitations.  Good appetite.

 

OBJECTIVE:

VITAL SIGNS:  Blood pressure is 120/70, temperature 96, pulse 68, respirations 18, O2 sats 96% on 2 l
iters.  Accu-Cheks range 124-154.

LUNGS:  Clear with decreased breath sounds in bases.

CARDIAC:  Examination shows an irregularly irregular rhythm.

ABDOMEN:  Obese and nontender.

SKIN/EXTREMITIES:  Left knee shows no erythema, warmth or tenderness.

 

ASSESSMENT:

1.  Resolving VRE infection of left knee on daptomycin, to finish a full 42-day course

2.  Obstructive sleep apnea, stable

3.  Morbid obesity, stable.

2.  Atrial fibrillation, stable.

5.  Hospital delirium appears to be improving.

 

PLAN:

1.  Continue PT, OT as patient is now walking 16 feet at a time with only standby assistance and do i
t several times daily.

2.  Continue daptomycin for full 42 days.

3.  Continue to monitor for delirium.

4.  Continue rate control of atrial fibrillation.

## 2018-01-20 RX ADMIN — Medication SCH ML: at 08:52

## 2018-01-20 RX ADMIN — Medication SCH MG: at 08:49

## 2018-01-20 RX ADMIN — Medication SCH ML: at 21:15

## 2018-01-20 RX ADMIN — HYDROCODONE BITARTRATE AND ACETAMINOPHEN PRN TAB: 10; 325 TABLET ORAL at 21:12

## 2018-01-20 RX ADMIN — INSULIN LISPRO PRN UNIT: 100 INJECTION, SOLUTION INTRAVENOUS; SUBCUTANEOUS at 21:15

## 2018-01-20 RX ADMIN — MULTIPLE VITAMINS W/ MINERALS TAB SCH TAB: TAB at 08:51

## 2018-01-20 RX ADMIN — NYSTATIN SCH APPLIC: 100000 POWDER TOPICAL at 08:51

## 2018-01-20 RX ADMIN — Medication SCH TAB: at 08:50

## 2018-01-20 RX ADMIN — NYSTATIN SCH APPLIC: 100000 POWDER TOPICAL at 21:08

## 2018-01-20 RX ADMIN — ASPIRIN SCH MG: 81 TABLET ORAL at 08:50

## 2018-01-20 RX ADMIN — ALOGLIPTIN SCH MG: 6.25 TABLET, FILM COATED ORAL at 08:49

## 2018-01-20 RX ADMIN — CYANOCOBALAMIN TAB 1000 MCG SCH MCG: 1000 TAB at 08:50

## 2018-01-20 NOTE — PRG
DATE OF SERVICE:  01/20/2018

 

SUBJECTIVE:  The patient feels well and his Hudson catheter with no dysuria, no urinary retention and 
having no pain in his knee, no shortness of breath, chest pain, sleeping well, good appetite.

 

OBJECTIVE:

VITAL SIGNS:  Showed blood pressures 136/68, O2 sats 98%, respirations 19, pulse 64, afebrile.

LUNGS:  Clear.

CARDIAC:  Shows an irregular rhythm.

ABDOMEN:  Soft, nontender.

EXTREMITIES:  Left knee shows no erythema, warmth, or tenderness.

 

ASSESSMENT:

1.  Resolving VRE infection of the left knee, on daptomycin 42 days.

2.  Stable morbid obesity.

3.  Benign prostatic hypertrophy, improved on Flomax and Hudson catheter out.

4.  Stable atrial fibrillation with rate control.

5.  Stable obstructive sleep apnea.

 

PLAN:  Continue to monitor.  Urinary retention, continue daptomycin for a total of 42 days.  Continue
 PT, OT.

## 2018-01-21 RX ADMIN — ASPIRIN SCH MG: 81 TABLET ORAL at 08:59

## 2018-01-21 RX ADMIN — Medication SCH TAB: at 08:59

## 2018-01-21 RX ADMIN — HYDROCODONE BITARTRATE AND ACETAMINOPHEN PRN TAB: 10; 325 TABLET ORAL at 21:51

## 2018-01-21 RX ADMIN — Medication SCH ML: at 21:52

## 2018-01-21 RX ADMIN — CYANOCOBALAMIN TAB 1000 MCG SCH MCG: 1000 TAB at 08:59

## 2018-01-21 RX ADMIN — NYSTATIN SCH APPLIC: 100000 POWDER TOPICAL at 09:01

## 2018-01-21 RX ADMIN — NYSTATIN SCH APPLIC: 100000 POWDER TOPICAL at 21:51

## 2018-01-21 RX ADMIN — Medication SCH MG: at 08:59

## 2018-01-21 RX ADMIN — MULTIPLE VITAMINS W/ MINERALS TAB SCH TAB: TAB at 09:00

## 2018-01-21 RX ADMIN — Medication SCH ML: at 09:01

## 2018-01-21 RX ADMIN — ALOGLIPTIN SCH MG: 6.25 TABLET, FILM COATED ORAL at 08:58

## 2018-01-21 NOTE — PRG
DATE OF SERVICE:  01/21/2018.

 

SUBJECTIVE:  The patient feels well, resting in bed with no complaints of shortness of breath, chest 
pain, orthopnea, paroxysmal nocturnal dyspnea, having decreasing pain in knee.  Does have occasional 
anxiety attacks and is complaining of some mild depression.

 

OBJECTIVE:

VITAL SIGNS:  Blood pressures 150/72, temperature 97, pulse 62, respirations 16, O2 sats 97% on 2 lit
ers.

LUNGS:  Clear.

CARDIAC:  Shows irregular rhythm.

MUSCULOSKELETAL:  Left knee is not inflamed, swollen, or red.

 

LABORATORY DATA:  Accu-Cheks ranged 123-193.

 

ASSESSMENT:

1.  Resolving VRE infection of left prosthetic knee.

2.  Stable atrial fibrillation on no anticoagulation with good rate control.

3.  Stable type 2 diabetes.

4.  Stable obstructive sleep apnea, occasional CPAP.

5.  Increasing anxiety and depression.

 

PLAN:

1.  Start citalopram 10 mg daily.

2.  Continue daptomycin until 02/02/2018.

3.  Arrange follow up with Dr. Newell and Dr. Wall.

4.  Continue to monitor for urinary retention.

## 2018-01-22 RX ADMIN — NYSTATIN SCH APPLIC: 100000 POWDER TOPICAL at 08:02

## 2018-01-22 RX ADMIN — Medication SCH ML: at 08:02

## 2018-01-22 RX ADMIN — Medication SCH ML: at 20:53

## 2018-01-22 RX ADMIN — MAGNESIUM HYDROXIDE PRN ML: 400 SUSPENSION ORAL at 08:03

## 2018-01-22 RX ADMIN — ASPIRIN SCH MG: 81 TABLET ORAL at 08:02

## 2018-01-22 RX ADMIN — Medication SCH TAB: at 08:02

## 2018-01-22 RX ADMIN — NYSTATIN SCH APPLIC: 100000 POWDER TOPICAL at 20:53

## 2018-01-22 RX ADMIN — MULTIPLE VITAMINS W/ MINERALS TAB SCH TAB: TAB at 08:01

## 2018-01-22 RX ADMIN — Medication SCH MG: at 08:00

## 2018-01-22 RX ADMIN — HYDROCODONE BITARTRATE AND ACETAMINOPHEN PRN TAB: 10; 325 TABLET ORAL at 20:55

## 2018-01-22 RX ADMIN — INSULIN LISPRO PRN UNIT: 100 INJECTION, SOLUTION INTRAVENOUS; SUBCUTANEOUS at 12:18

## 2018-01-22 RX ADMIN — Medication PRN ML: at 14:34

## 2018-01-22 RX ADMIN — INSULIN LISPRO PRN UNIT: 100 INJECTION, SOLUTION INTRAVENOUS; SUBCUTANEOUS at 16:28

## 2018-01-22 RX ADMIN — ALOGLIPTIN SCH MG: 6.25 TABLET, FILM COATED ORAL at 08:01

## 2018-01-22 RX ADMIN — CYANOCOBALAMIN TAB 1000 MCG SCH MCG: 1000 TAB at 08:01

## 2018-01-22 NOTE — PRG
DATE OF SERVICE:  01/22/2018

 

SUBJECTIVE:  The patient feels well, lying in bed with no complaints.  Cooperating well with therapy.


 

OBJECTIVE:  Temperature 98.5, pulse 60, respiratory 17, O2 sat is 92% on 2 liters, blood pressure 120
/70.

 

LABORATORY DATA:  White count 10, hematocrit 38, hemoglobin 12, sed rate is down to 29.  Accu-Cheks r
chandra 157-162.

 

ASSESSMENT:

1.  Resolving vancomycin-resistant enterococci infection of left knee.

2.  Stable atrial fibrillation with rate control.

3.  Chronic obstructive pulmonary disease, stable.

4.  Obstructive sleep apnea, stable.

5.  Deconditioning, improving daily.

6.  Depression anxiety, started on citalopram.

 

PLAN:  Continue daptomycin until 02/02/2018, continue citalopram 10 mg daily, continue to monitor uri
nary retention.  Follow up with Orthopedics and Neurology.  Continue to monitor Accu-Cheks, diabetic 
control.

## 2018-01-23 RX ADMIN — Medication PRN ML: at 14:17

## 2018-01-23 RX ADMIN — Medication SCH ML: at 20:42

## 2018-01-23 RX ADMIN — INSULIN LISPRO PRN UNIT: 100 INJECTION, SOLUTION INTRAVENOUS; SUBCUTANEOUS at 16:55

## 2018-01-23 RX ADMIN — HYDROCODONE BITARTRATE AND ACETAMINOPHEN PRN TAB: 10; 325 TABLET ORAL at 15:29

## 2018-01-23 RX ADMIN — Medication SCH ML: at 08:54

## 2018-01-23 RX ADMIN — CYANOCOBALAMIN TAB 1000 MCG SCH MCG: 1000 TAB at 08:52

## 2018-01-23 RX ADMIN — NYSTATIN SCH APPLIC: 100000 POWDER TOPICAL at 20:41

## 2018-01-23 RX ADMIN — ASPIRIN SCH MG: 81 TABLET ORAL at 08:53

## 2018-01-23 RX ADMIN — Medication SCH TAB: at 08:52

## 2018-01-23 RX ADMIN — MULTIPLE VITAMINS W/ MINERALS TAB SCH TAB: TAB at 08:53

## 2018-01-23 RX ADMIN — NYSTATIN SCH APPLIC: 100000 POWDER TOPICAL at 08:56

## 2018-01-23 RX ADMIN — Medication SCH MG: at 08:51

## 2018-01-23 RX ADMIN — ALOGLIPTIN SCH MG: 6.25 TABLET, FILM COATED ORAL at 08:51

## 2018-01-23 NOTE — PRG
DATE OF SERVICE:  01/23/2018

 

SUBJECTIVE:  The patient feels well with decreasing pain, increasing strength.  No nausea, palpitatio
ns or chest pain.

 

OBJECTIVE:

VITAL SIGNS:  Shows blood pressure 141/72, temperature 97, pulse 60, respirations 17, O2 sats 96% on 
2 liters.  Accu-Cheks range 109-128.

LUNGS:  Clear.

CARDIAC:  Shows irregular rhythm.

ABDOMEN:  Soft and nontender with no masses or organomegaly.

EXTREMITIES:  Display no edema, clubbing, or cyanosis.  Knee is healing well with minimal swelling.  
No erythema, warmth, or tenderness.

 

ASSESSMENT:  Resolving vancomycin-resistant enterococci infection of left knee, stable atrial fibrill
ation, stable chronic obstructive pulmonary disease, stable sleep apnea, improving deconditioning.

 

PLAN:

1.  Obtain CBC, comp metabolic, sed rate, CRP in the a.m.

2.  Continue daptomycin for a full 42 days.

3.  Continue PT, OT.

## 2018-01-24 LAB
ALBUMIN SERPL BCG-MCNC: 3.1 G/DL (ref 3.4–4.8)
ALP SERPL-CCNC: 111 U/L (ref 40–150)
ALT SERPL W P-5'-P-CCNC: 36 U/L (ref 8–55)
ANION GAP SERPL CALC-SCNC: 11 MMOL/L (ref 10–20)
AST SERPL-CCNC: 32 U/L (ref 5–34)
BASOPHILS # BLD AUTO: 0.1 THOU/UL (ref 0–0.2)
BASOPHILS NFR BLD AUTO: 1.3 % (ref 0–1)
BILIRUB SERPL-MCNC: 0.5 MG/DL (ref 0.2–1.2)
BUN SERPL-MCNC: 20 MG/DL (ref 8.4–25.7)
CALCIUM SERPL-MCNC: 9.5 MG/DL (ref 7.8–10.44)
CHLORIDE SERPL-SCNC: 104 MMOL/L (ref 98–107)
CO2 SERPL-SCNC: 26 MMOL/L (ref 23–31)
CREAT CL PREDICTED SERPL C-G-VRATE: 117 ML/MIN (ref 70–130)
CRP SERPL-MCNC: 2.16 MG/DL
EOSINOPHIL # BLD AUTO: 1 THOU/UL (ref 0–0.7)
EOSINOPHIL NFR BLD AUTO: 9.4 % (ref 0–10)
GLOBULIN SER CALC-MCNC: 3.8 G/DL (ref 2.4–3.5)
GLUCOSE SERPL-MCNC: 109 MG/DL (ref 83–110)
HGB BLD-MCNC: 12.2 G/DL (ref 14–18)
LYMPHOCYTES # BLD AUTO: 2.6 THOU/UL (ref 1.2–3.4)
LYMPHOCYTES NFR BLD AUTO: 25 % (ref 21–51)
MCH RBC QN AUTO: 27 PG (ref 27–31)
MCV RBC AUTO: 86.4 FL (ref 80–94)
MONOCYTES # BLD AUTO: 1 THOU/UL (ref 0.11–0.59)
MONOCYTES NFR BLD AUTO: 9.9 % (ref 0–10)
NEUTROPHILS # BLD AUTO: 5.6 THOU/UL (ref 1.4–6.5)
NEUTROPHILS NFR BLD AUTO: 54.3 % (ref 42–75)
PLATELET # BLD AUTO: 125 THOU/UL (ref 130–400)
POTASSIUM SERPL-SCNC: 4.4 MMOL/L (ref 3.5–5.1)
RBC # BLD AUTO: 4.53 MILL/UL (ref 4.7–6.1)
SODIUM SERPL-SCNC: 137 MMOL/L (ref 136–145)
WBC # BLD AUTO: 10.3 THOU/UL (ref 4.8–10.8)

## 2018-01-24 RX ADMIN — HYDROCODONE BITARTRATE AND ACETAMINOPHEN PRN TAB: 10; 325 TABLET ORAL at 20:55

## 2018-01-24 RX ADMIN — MAGNESIUM HYDROXIDE PRN ML: 400 SUSPENSION ORAL at 05:02

## 2018-01-24 RX ADMIN — NYSTATIN SCH APPLIC: 100000 POWDER TOPICAL at 20:55

## 2018-01-24 RX ADMIN — Medication SCH ML: at 08:24

## 2018-01-24 RX ADMIN — Medication SCH ML: at 20:55

## 2018-01-24 RX ADMIN — Medication SCH MG: at 08:22

## 2018-01-24 RX ADMIN — NYSTATIN SCH APPLIC: 100000 POWDER TOPICAL at 08:24

## 2018-01-24 RX ADMIN — MULTIPLE VITAMINS W/ MINERALS TAB SCH TAB: TAB at 08:24

## 2018-01-24 RX ADMIN — CYANOCOBALAMIN TAB 1000 MCG SCH MCG: 1000 TAB at 08:23

## 2018-01-24 RX ADMIN — ASPIRIN SCH MG: 81 TABLET ORAL at 08:22

## 2018-01-24 RX ADMIN — ALOGLIPTIN SCH MG: 6.25 TABLET, FILM COATED ORAL at 08:22

## 2018-01-24 RX ADMIN — Medication SCH TAB: at 08:22

## 2018-01-25 RX ADMIN — ALOGLIPTIN SCH MG: 6.25 TABLET, FILM COATED ORAL at 08:22

## 2018-01-25 RX ADMIN — MULTIPLE VITAMINS W/ MINERALS TAB SCH TAB: TAB at 08:25

## 2018-01-25 RX ADMIN — Medication SCH ML: at 20:30

## 2018-01-25 RX ADMIN — HYDROCODONE BITARTRATE AND ACETAMINOPHEN PRN TAB: 10; 325 TABLET ORAL at 23:20

## 2018-01-25 RX ADMIN — ASPIRIN SCH MG: 81 TABLET ORAL at 08:23

## 2018-01-25 RX ADMIN — NYSTATIN SCH APPLIC: 100000 POWDER TOPICAL at 09:49

## 2018-01-25 RX ADMIN — Medication SCH MG: at 08:22

## 2018-01-25 RX ADMIN — CYANOCOBALAMIN TAB 1000 MCG SCH MCG: 1000 TAB at 08:23

## 2018-01-25 RX ADMIN — Medication SCH ML: at 08:25

## 2018-01-25 RX ADMIN — Medication SCH TAB: at 08:23

## 2018-01-25 RX ADMIN — NYSTATIN SCH APPLIC: 100000 POWDER TOPICAL at 20:29

## 2018-01-25 NOTE — PRG
DATE OF SERVICE:  01/24/2018

 

SUBJECTIVE:  The patient feels well.  Cooperating well with therapy with no complaints of knee pain. 
 He is passing urine but well, having no shortness breath, tolerating CPAP at night.

 

OBJECTIVE:  

VITAL SIGNS:  O2 sat is 93% on 2 liters, respirations 20, pulse 62, afebrile, blood pressure 139/72.

LUNGS:  Clear with decreased breath sounds in bases.  

CARDIAC:  Cardiac examination showed an irregularly irregular rhythm.

ABDOMEN:  Soft, nontender with no masses or organomegaly.

EXTREMITIES:  Display healing left total knee with minimal erythema, no warmth.  

 

LABORATORY:   Laboratory show sed rate stable at 33.  White count is down 10,300, hematocrit 39, hemo
globin 12.  Accu-Cheks 118 to 167.  C-reactive protein down to 216, sodium 137, potassium 4.4, chlori
de 104, bicarb 26, BUN 20, creatinine 0.87, AST 32, ALT 36.

 

ASSESSMENT:

1.  Resolving VRE infection of left total knee on daptomycin until 02/02/2018.  

2.  Sleep apnea, stable on CPAP.

3.  Atrial fibrillation with adequate rate control, no anticoagulation secondary to previous bleed.

4.  Diabetes type 2, controlled to goal.

5.  Anxiety and depression improved.

 

PLAN:  

1.  Continue PT, OT.  

2.  Continue IV daptomycin until 02/02/2018.  

3.  Continue Avodart and finasteride for benign prostatic hypertrophy.  

4.  Discontinue oxybutynin.

## 2018-01-26 RX ADMIN — Medication SCH MG: at 08:46

## 2018-01-26 RX ADMIN — MULTIPLE VITAMINS W/ MINERALS TAB SCH TAB: TAB at 08:45

## 2018-01-26 RX ADMIN — Medication SCH ML: at 20:46

## 2018-01-26 RX ADMIN — HYDROCODONE BITARTRATE AND ACETAMINOPHEN PRN TAB: 10; 325 TABLET ORAL at 15:59

## 2018-01-26 RX ADMIN — ASPIRIN SCH MG: 81 TABLET ORAL at 08:55

## 2018-01-26 RX ADMIN — Medication SCH TAB: at 08:46

## 2018-01-26 RX ADMIN — INSULIN LISPRO PRN UNIT: 100 INJECTION, SOLUTION INTRAVENOUS; SUBCUTANEOUS at 12:05

## 2018-01-26 RX ADMIN — Medication SCH ML: at 08:47

## 2018-01-26 RX ADMIN — CYANOCOBALAMIN TAB 1000 MCG SCH MCG: 1000 TAB at 08:45

## 2018-01-26 RX ADMIN — ALOGLIPTIN SCH MG: 6.25 TABLET, FILM COATED ORAL at 08:45

## 2018-01-26 RX ADMIN — HYDROCODONE BITARTRATE AND ACETAMINOPHEN PRN TAB: 10; 325 TABLET ORAL at 22:00

## 2018-01-26 RX ADMIN — NYSTATIN SCH APPLIC: 100000 POWDER TOPICAL at 08:46

## 2018-01-26 RX ADMIN — NYSTATIN SCH APPLIC: 100000 POWDER TOPICAL at 20:47

## 2018-01-27 RX ADMIN — MULTIPLE VITAMINS W/ MINERALS TAB SCH TAB: TAB at 09:13

## 2018-01-27 RX ADMIN — NYSTATIN SCH APPLIC: 100000 POWDER TOPICAL at 09:14

## 2018-01-27 RX ADMIN — Medication SCH ML: at 09:14

## 2018-01-27 RX ADMIN — INSULIN LISPRO PRN UNIT: 100 INJECTION, SOLUTION INTRAVENOUS; SUBCUTANEOUS at 15:59

## 2018-01-27 RX ADMIN — Medication PRN ML: at 14:09

## 2018-01-27 RX ADMIN — Medication SCH MG: at 09:12

## 2018-01-27 RX ADMIN — NYSTATIN SCH APPLIC: 100000 POWDER TOPICAL at 21:09

## 2018-01-27 RX ADMIN — ASPIRIN SCH MG: 81 TABLET ORAL at 09:13

## 2018-01-27 RX ADMIN — Medication SCH TAB: at 09:14

## 2018-01-27 RX ADMIN — ALOGLIPTIN SCH MG: 6.25 TABLET, FILM COATED ORAL at 09:13

## 2018-01-27 RX ADMIN — HYDROCODONE BITARTRATE AND ACETAMINOPHEN PRN TAB: 10; 325 TABLET ORAL at 17:40

## 2018-01-27 RX ADMIN — CYANOCOBALAMIN TAB 1000 MCG SCH MCG: 1000 TAB at 09:12

## 2018-01-27 RX ADMIN — Medication SCH ML: at 21:10

## 2018-01-27 NOTE — PRG
DATE OF SERVICE:  01/25/2018

 

SUBJECTIVE:  Patient is an 82-year-old white male with an infected left prosthetic knee with Enteroco
ccus.  Surgical debridement was done and patient has _____ placed antibiotics in the wound.  He was a
lso on daptomycin 1000 mg IV.  He is nonweightbearing and was transferred to [facility] for continued physical therapy and occupational therapy.  He is being followed by Dr. Torres.

 

The patient states he is doing well.  He is eating well and his wife is in the room and I have answer
ed all questions.

 

SUBJECTIVE:

VITAL SIGNS:  Reveal blood pressure was 138/72, pulse 59-60, respirations 18, O2 sat 96% on 2 liters,
 temperature max 98 degrees.

GENERAL:  This is a well-developed, slightly obese white male in no apparent distress at this time.

HEENT:  Reveals normocephalic, nontraumatic cranium.  Pupils are equally round and reactive.  Extraoc
ular movements are intact.  Nose and throat are slightly dry.

NECK:  Supple, without masses, nodes or bruits.

CHEST:  Clear to auscultation.  No rales, rhonchi or wheezes are heard.

CARDIOVASCULAR:  Reveals a regular rate and rhythm without murmurs, gallops or rubs.

ABDOMEN:  Obese.  Soft, nontender, without organomegaly, normal bowel sounds are noted.  No rebound o
r guarding is noted.

:  Deferred.

EXTREMITIES:  Reveal no clubbing, cyanosis or edema.  Left leg still is healing very well.  It is not
 red, not warm.  No sutures are noted.

 

IMPRESSION:

1.  Enterococcus urinary tract infection.

2.  Prosthetic knee with spacer antibiotics and IV antibiotics for enterococcus.

3.  Diabetes type 2, well controlled.

4.  Obstructive sleep apnea.

5.  Diastolic heart failure.

6.  Santos's esophagitis.

7.  History of atrial fibrillation.

8.  Recurrent deep venous thrombus.

9.  History of GI ulcer with a life-threatening gastroenteritis bleed.

10.  Occasional confusion.

11.  Generalized weakness.

 

PLAN:

1.  Continue antibiotics per Dr. Torres's recommendations.

2.  Continue present medications.

3.  Deep venous thrombosis prophylaxis.

4.  Decubitus precautions.

5.  Stress ulcer prophylaxis.

6.  Physical therapy and occupational therapy.

7.  Supportive care.

## 2018-01-27 NOTE — PRG
DATE OF SERVICE:  01/27/2018

 

SUBJECTIVE:  Mr. Murray is doing well.  Denies any complaints.  No further constipation issues.  Tolera
ting his medications and he states that he is going to be discharged next week.

 

OBJECTIVE:

VITAL SIGNS:  He is afebrile, heart rate is 93, respirations 17, oxygen saturation 96%, and blood pre
ssure 131/73.

CARDIOVASCULAR SYSTEM:  S1, S2 plus.

RESPIRATORY SYSTEM:  Normal vesicular breath sounds.

ABDOMEN:  Soft, nontender, bowel sounds heard in all quadrants.

EXTREMITIES:  Without cyanosis or clubbing.

CENTRAL NERVOUS SYSTEM:  Improving deconditioning.

 

IMPRESSION:

1.  Prosthetic joint infection, left knee.

2.  Diabetes mellitus type 2.

3.  Chronic diastolic congestive heart failure.

4.  Obstructive sleep apnea.

5.  Improving deconditioning.

 

PLAN:

1.  Continue current medications.

2.  Nutritional support.

3.  DVT and stress ulcer prophylaxis.

4.  Decubitus precautions.

5.  Bowel regimen.

6.  Weekly CBC, CRP, sed rate.

7.  Discussed with patient and spouse in detail and all questions answered.

## 2018-01-28 RX ADMIN — NYSTATIN SCH APPLIC: 100000 POWDER TOPICAL at 21:20

## 2018-01-28 RX ADMIN — INSULIN LISPRO PRN UNIT: 100 INJECTION, SOLUTION INTRAVENOUS; SUBCUTANEOUS at 11:46

## 2018-01-28 RX ADMIN — MULTIPLE VITAMINS W/ MINERALS TAB SCH TAB: TAB at 09:01

## 2018-01-28 RX ADMIN — Medication SCH TAB: at 09:01

## 2018-01-28 RX ADMIN — NYSTATIN SCH APPLIC: 100000 POWDER TOPICAL at 09:02

## 2018-01-28 RX ADMIN — ASPIRIN SCH MG: 81 TABLET ORAL at 09:01

## 2018-01-28 RX ADMIN — CYANOCOBALAMIN TAB 1000 MCG SCH MCG: 1000 TAB at 09:00

## 2018-01-28 RX ADMIN — Medication SCH ML: at 09:02

## 2018-01-28 RX ADMIN — MAGNESIUM HYDROXIDE PRN ML: 400 SUSPENSION ORAL at 11:46

## 2018-01-28 RX ADMIN — Medication PRN ML: at 14:13

## 2018-01-28 RX ADMIN — Medication SCH ML: at 21:21

## 2018-01-28 RX ADMIN — INSULIN LISPRO PRN UNIT: 100 INJECTION, SOLUTION INTRAVENOUS; SUBCUTANEOUS at 16:48

## 2018-01-28 RX ADMIN — ALOGLIPTIN SCH MG: 6.25 TABLET, FILM COATED ORAL at 09:01

## 2018-01-28 RX ADMIN — HYDROCODONE BITARTRATE AND ACETAMINOPHEN PRN TAB: 10; 325 TABLET ORAL at 21:18

## 2018-01-28 RX ADMIN — Medication SCH MG: at 09:00

## 2018-01-28 NOTE — PRG
DATE OF SERVICE:  01/28/2018

 

SUBJECTIVE:  Mr. Murray is doing well.  Denies any complaints, resting comfortably.  Wife is not in the
 room.

 

OBJECTIVE:

VITAL SIGNS:  He is afebrile, heart rate 61, respirations 18, oxygen saturation 94%, blood pressure 1
47/67.

CARDIOVASCULAR SYSTEM:  S1 and S2 plus.

RESPIRATORY SYSTEM:  Normal vesicular breath sounds.

ABDOMEN:  Soft, obese, nontender, bowel sounds heard in all quadrants.

EXTREMITIES:  Without cyanosis or clubbing.  Left knee incision is healed and healthy.

CENTRAL NERVOUS SYSTEM:  Improving deconditioning.

 

IMPRESSION:

1.  Prosthetic joint infection in his left knee.

2.  Diabetes mellitus type 2.

3.  Chronic diastolic congestive heart failure.

4.  Obstructive sleep apnea.

5.  Improving deconditioning.

6.  Resolved constipation.

 

PLAN:

1.  Continue current medications.

2.  Nutritional support.

3.  Heart healthy diet.

4.  DVT and stress ulcer prophylaxis.

5.  Decubitus precautions.

6.  Routine laboratory values.

7.  Dr. Torres back tonight.

## 2018-01-29 RX ADMIN — MULTIPLE VITAMINS W/ MINERALS TAB SCH TAB: TAB at 08:25

## 2018-01-29 RX ADMIN — Medication SCH ML: at 20:56

## 2018-01-29 RX ADMIN — Medication SCH ML: at 08:25

## 2018-01-29 RX ADMIN — ASPIRIN SCH MG: 81 TABLET ORAL at 08:24

## 2018-01-29 RX ADMIN — Medication SCH TAB: at 08:24

## 2018-01-29 RX ADMIN — CYANOCOBALAMIN TAB 1000 MCG SCH MCG: 1000 TAB at 08:24

## 2018-01-29 RX ADMIN — HYDROCODONE BITARTRATE AND ACETAMINOPHEN PRN TAB: 10; 325 TABLET ORAL at 20:56

## 2018-01-29 RX ADMIN — NYSTATIN SCH APPLIC: 100000 POWDER TOPICAL at 08:39

## 2018-01-29 RX ADMIN — NYSTATIN SCH APPLIC: 100000 POWDER TOPICAL at 20:56

## 2018-01-29 RX ADMIN — ALOGLIPTIN SCH MG: 6.25 TABLET, FILM COATED ORAL at 08:23

## 2018-01-29 RX ADMIN — Medication SCH MG: at 08:23

## 2018-01-30 LAB
ALBUMIN SERPL BCG-MCNC: 3.2 G/DL (ref 3.4–4.8)
ALP SERPL-CCNC: 110 U/L (ref 40–150)
ALT SERPL W P-5'-P-CCNC: 32 U/L (ref 8–55)
ANION GAP SERPL CALC-SCNC: 13 MMOL/L (ref 10–20)
AST SERPL-CCNC: 30 U/L (ref 5–34)
BASOPHILS # BLD AUTO: 0.1 THOU/UL (ref 0–0.2)
BASOPHILS NFR BLD AUTO: 1.3 % (ref 0–1)
BILIRUB SERPL-MCNC: 0.5 MG/DL (ref 0.2–1.2)
BUN SERPL-MCNC: 18 MG/DL (ref 8.4–25.7)
CALCIUM SERPL-MCNC: 9.4 MG/DL (ref 7.8–10.44)
CHLORIDE SERPL-SCNC: 107 MMOL/L (ref 98–107)
CO2 SERPL-SCNC: 22 MMOL/L (ref 23–31)
CREAT CL PREDICTED SERPL C-G-VRATE: 101 ML/MIN (ref 70–130)
EOSINOPHIL # BLD AUTO: 0.9 THOU/UL (ref 0–0.7)
EOSINOPHIL NFR BLD AUTO: 8.5 % (ref 0–10)
GLOBULIN SER CALC-MCNC: 3.7 G/DL (ref 2.4–3.5)
GLUCOSE SERPL-MCNC: 125 MG/DL (ref 83–110)
HGB BLD-MCNC: 12.5 G/DL (ref 14–18)
LYMPHOCYTES # BLD AUTO: 3.1 THOU/UL (ref 1.2–3.4)
LYMPHOCYTES NFR BLD AUTO: 29.4 % (ref 21–51)
MCH RBC QN AUTO: 26.7 PG (ref 27–31)
MCV RBC AUTO: 84.7 FL (ref 80–94)
MONOCYTES # BLD AUTO: 1 THOU/UL (ref 0.11–0.59)
MONOCYTES NFR BLD AUTO: 9.2 % (ref 0–10)
NEUTROPHILS # BLD AUTO: 5.5 THOU/UL (ref 1.4–6.5)
NEUTROPHILS NFR BLD AUTO: 51.6 % (ref 42–75)
PLATELET # BLD AUTO: 138 THOU/UL (ref 130–400)
POTASSIUM SERPL-SCNC: 4.1 MMOL/L (ref 3.5–5.1)
RBC # BLD AUTO: 4.68 MILL/UL (ref 4.7–6.1)
SODIUM SERPL-SCNC: 138 MMOL/L (ref 136–145)
WBC # BLD AUTO: 10.7 THOU/UL (ref 4.8–10.8)

## 2018-01-30 RX ADMIN — Medication SCH ML: at 21:17

## 2018-01-30 RX ADMIN — Medication SCH MG: at 08:37

## 2018-01-30 RX ADMIN — ASPIRIN SCH MG: 81 TABLET ORAL at 08:37

## 2018-01-30 RX ADMIN — HYDROCODONE BITARTRATE AND ACETAMINOPHEN PRN TAB: 10; 325 TABLET ORAL at 21:16

## 2018-01-30 RX ADMIN — NYSTATIN SCH APPLIC: 100000 POWDER TOPICAL at 08:39

## 2018-01-30 RX ADMIN — ALOGLIPTIN SCH MG: 6.25 TABLET, FILM COATED ORAL at 08:36

## 2018-01-30 RX ADMIN — Medication SCH ML: at 08:38

## 2018-01-30 RX ADMIN — CYANOCOBALAMIN TAB 1000 MCG SCH MCG: 1000 TAB at 08:37

## 2018-01-30 RX ADMIN — NYSTATIN SCH APPLIC: 100000 POWDER TOPICAL at 21:17

## 2018-01-30 RX ADMIN — Medication SCH TAB: at 08:37

## 2018-01-30 RX ADMIN — MULTIPLE VITAMINS W/ MINERALS TAB SCH TAB: TAB at 08:38

## 2018-01-30 NOTE — RAD
PORTABLE CHEST 1 VIEW:

 

DATE: 1/30/18.

TIME: 11:44 a.m.

 

HISTORY: 

CHF, COPD.  The patient had recent knee surgery.

 

FINDINGS: 

Comparison is made with the exam of 12/28/17.

 

The heart is enlarged.  Left-sided pacing device remains in place.  The right PICC line remains in un
changed in position.  There is mild prominence of the pulmonary vascularity.  No lobar consolidation,
 pneumothoraces, or large effusions are seen.

 

IMPRESSION: 

Stable exam.

 

POS: TIFFANIE

## 2018-01-30 NOTE — PRG
DATE OF SERVICE:  01/30/2018

 

SUBJECTIVE:  The patient feels well.  No complaints, decreasing pain in his left knee.  No shortness 
of breath at rest and decreasing on exertion, sleeping well with no confusion.

 

OBJECTIVE:  

VITAL SIGNS:  Blood pressure is 133/79, temperature is 96.4, pulse 64, respirations 18, O2 sats 96% o
n 2 liters.  

LUNGS:  Lungs are clear, decreased breath sounds in the bases.  

CARDIAC:  Cardiac examination shows irregular regular rhythm.

ABDOMEN:  Obese and nontender.  

SKIN AND EXTREMITIES:  Skin and extremities display no edema, clubbing, cyanosis.  There is a healing
 incision over the left knee with no tenderness, minimal erythema, no swelling.

 

ASSESSMENT:  

1.  Resolving VRE infection, due to finish 6 weeks of antibiotics on 02/02/2018.

2.  Stable morbid obesity.

3.  Stable atrial fibrillation with rate control.

4.  Stable chronic obstructive pulmonary disease with persistent hypoxia, but improving.

5.  Stable sleep apnea with fair compliance with CPAP.

 

PLAN:  Repeat laboratories tomorrow.  Finish antibiotics 02/02/2018, hopefully discharge home.  Kalpana
w up with Dr. Wall next week.  Discontinue oxygen and monitor O2 sats.

## 2018-01-31 RX ADMIN — ASPIRIN SCH MG: 81 TABLET ORAL at 09:05

## 2018-01-31 RX ADMIN — NYSTATIN SCH APPLIC: 100000 POWDER TOPICAL at 20:17

## 2018-01-31 RX ADMIN — Medication PRN ML: at 13:52

## 2018-01-31 RX ADMIN — CYANOCOBALAMIN TAB 1000 MCG SCH MCG: 1000 TAB at 09:04

## 2018-01-31 RX ADMIN — Medication SCH ML: at 20:17

## 2018-01-31 RX ADMIN — NYSTATIN SCH APPLIC: 100000 POWDER TOPICAL at 09:06

## 2018-01-31 RX ADMIN — ALOGLIPTIN SCH MG: 6.25 TABLET, FILM COATED ORAL at 09:04

## 2018-01-31 RX ADMIN — Medication SCH MG: at 09:04

## 2018-01-31 RX ADMIN — MAGNESIUM HYDROXIDE PRN ML: 400 SUSPENSION ORAL at 18:05

## 2018-01-31 RX ADMIN — Medication SCH TAB: at 09:04

## 2018-01-31 RX ADMIN — INSULIN LISPRO PRN UNIT: 100 INJECTION, SOLUTION INTRAVENOUS; SUBCUTANEOUS at 12:11

## 2018-01-31 RX ADMIN — Medication SCH ML: at 09:04

## 2018-01-31 RX ADMIN — MULTIPLE VITAMINS W/ MINERALS TAB SCH TAB: TAB at 09:05

## 2018-01-31 NOTE — PRG
DATE OF SERVICE:  01/30/2018

 

SUBJECTIVE:  The patient feels well, no pain in knee, and walking with therapy.  No shortness of patti
th at rest, good appetite, sleeping well.

 

OBJECTIVE:

GENERAL:  Therapist states that patient is walking 43 feet for different times during the day, transf
erring with supervision only, sitting up in a wheelchair.

VITAL SIGNS:  Show blood pressure 146/74, temperature 95.3, pulse 62, respirations 20, O2 sat is 92% 
on 2 L.

LUNGS:  Show decreased breath sounds in the bases.

CARDIAC EXAMINATION:  Shows an irregularly irregular rhythm.

ABDOMEN:  Obese and nontender.

EXTREMITIES:  Left knee shows minimal tenderness and swelling and erythema.  Good range of motion.

 

LABORATORY DATA:  White count is down 10,700, hematocrit 39, hemoglobin 12.  Sed rate 31.  Accu-Cheks
 ranged 116-193.  Sodium 138, potassium 4.1, chloride 107, bicarbonate 22, BUN was 18, creatinine 1.0
1, glucose 125.  C-reactive protein of 1.55.  Liver functions normal.

 

ASSESSMENT:

1.  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

2.  Stable atrial fibrillation, rate controlled.

3.  Stable sleep apnea and chronic obstructive pulmonary disease.

4.  Improving deconditioning.

 

PLAN:  Continue PT and OT.  Continue IV daptomycin until 02/02/2018.  Monitor O2 sats off oxygen.

## 2018-02-01 RX ADMIN — Medication SCH ML: at 08:18

## 2018-02-01 RX ADMIN — MULTIPLE VITAMINS W/ MINERALS TAB SCH TAB: TAB at 08:17

## 2018-02-01 RX ADMIN — ASPIRIN SCH MG: 81 TABLET ORAL at 08:17

## 2018-02-01 RX ADMIN — Medication SCH MG: at 08:16

## 2018-02-01 RX ADMIN — Medication SCH TAB: at 08:18

## 2018-02-01 RX ADMIN — NYSTATIN SCH APPLIC: 100000 POWDER TOPICAL at 20:29

## 2018-02-01 RX ADMIN — INSULIN LISPRO PRN UNIT: 100 INJECTION, SOLUTION INTRAVENOUS; SUBCUTANEOUS at 11:46

## 2018-02-01 RX ADMIN — CYANOCOBALAMIN TAB 1000 MCG SCH MCG: 1000 TAB at 08:17

## 2018-02-01 RX ADMIN — Medication SCH ML: at 20:29

## 2018-02-01 RX ADMIN — NYSTATIN SCH: 100000 POWDER TOPICAL at 09:00

## 2018-02-01 RX ADMIN — ALOGLIPTIN SCH MG: 6.25 TABLET, FILM COATED ORAL at 08:18

## 2018-02-01 RX ADMIN — Medication PRN ML: at 14:32

## 2018-02-01 NOTE — PRG
DATE OF SERVICE:  02/01/2018

 

SUBJECTIVE:  The patient feels well with no pain in knee, cooperating with therapy, tolerating CPAP w
ith no oxygen at night, alert, oriented and lucid.

 

OBJECTIVE:  Shows vital signs with temperature of 96.2, pulse 63, respirations 16, O2 sats 96% on neena
m air, blood pressure 135/79.  Most recent sed rate is 31.  Accu-Cheks range 118 to 181.  Lungs showe
d decreased breath sounds at bases.  Cardiac examination shows an irregularly regular rhythm.  Left k
nee shows healing incision with no erythema, warmth or tenderness.

 

ASSESSMENT:

1.  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
omycin to finish tomorrow.  We will discuss with Dr. Mack about continued antibiotics.

2.  Stable atrial fibrillation with rate control with no anticoagulation.

3.  Sleep apnea, stable on CPAP.

4.  Morbid obesity, stable.

5.  Deconditioning, improving daily.

 

PLAN:  Continue physical therapy, finish daptomycin tomorrow.  Discuss outpatient oral antibiotics wi
 Dr. Mack.  Plan to discharge tomorrow to follow up with Dr. Wall in 2 weeks.

## 2018-02-01 NOTE — PRG
DATE OF SERVICE:  01/31/2018

 

SUBJECTIVE:  The patient feels well, resting in bed, no complaints.  Cooperating well with therapy.  
Plan for discharge in the next several days.

 

OBJECTIVE:  

VITAL SIGNS:  Blood pressure is 124/65, temperature is 95.9, pulse 61 and regular, respirations 16, O
2 sats 95% on room air.  

LUNGS:  Lungs show decreased breath sounds in bases.  

CARDIAC:  Cardiac examination shows an irregular regular rhythm.  

EXTREMITIES:  Left knee shows minimal erythema, tenderness, increasing range of motion, minimal swell
ing.  

 

PT states the patient is transferring with supervision only, sitting up in the chair, walking short d
istances of 16 and 30 feet several times daily with normal saturation, mild shortness of breath.

 

ASSESSMENT:  

1.  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

2.  Improving deconditioning.

3.  Stable atrial fibrillation, rate controlled on anticoagulation.

4.  Stable chronic obstructive pulmonary disease and sleep apnea with fair compliance with CPAP, with
 no requirements of oxygen at this time.

 

PLAN:  Finish antibiotics tomorrow and discharge home tomorrow.  Follow up Dr. Wall and discuss [facility] with family and patient.

## 2018-02-02 VITALS — TEMPERATURE: 96 F | DIASTOLIC BLOOD PRESSURE: 72 MMHG | SYSTOLIC BLOOD PRESSURE: 131 MMHG

## 2018-02-02 RX ADMIN — Medication SCH ML: at 10:54

## 2018-02-02 RX ADMIN — MULTIPLE VITAMINS W/ MINERALS TAB SCH TAB: TAB at 09:21

## 2018-02-02 RX ADMIN — ALOGLIPTIN SCH MG: 6.25 TABLET, FILM COATED ORAL at 09:22

## 2018-02-02 RX ADMIN — ASPIRIN SCH MG: 81 TABLET ORAL at 09:22

## 2018-02-02 RX ADMIN — Medication SCH TAB: at 09:21

## 2018-02-02 RX ADMIN — Medication SCH MG: at 09:20

## 2018-02-02 RX ADMIN — CYANOCOBALAMIN TAB 1000 MCG SCH MCG: 1000 TAB at 09:22

## 2018-02-02 NOTE — DIS
FINAL DIAGNOSES:  

1.  Resolved infection of prosthetic right knee with vancomycin resistant Enterococcus after finishin
g 6 weeks of antibiotics and now on prophylactic penicillin.

2.  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

3.  History of gastroesophageal reflux and Santos's esophagus.

4.  History of atrial fibrillation paroxysmally with rate control.

5.  Diabetes type 2.

6.  Obstructive sleep apnea, on CPAP.

7.  Morbid obesity.

8.  Deconditioning.

9.  Benign prostatic hypertrophy, lower tract obstructive symptoms.  

10.  New onset of depression.

11.  Chronic hypothyroidism.

 

HOSPITAL COURSE:  The patient is a very pleasant 82-year-old white male with multiple medical problem
s who was admitted to the SN[facility] after a long stay at [facility] for recurrent infection of his prosthet
ic right knee essentially requiring replacement of the knee with a functional placer placed and treat
ment with IV daptomycin 1 gram daily for 6 weeks because of recurrent infection and vancomycin resist
ant Enterococcus.  He did well during his stay here with initially some erythema and tenderness in hi
s knee and swelling and no weightbearing, but after 2 weeks, his symptoms improved greatly swelling r
esolved as did his erythema.  He was seen by his orthopedic surgeon, Dr. Michael Wall, and released t
o start on partial weightbearing with therapy and continued on that for the following 5 weeks and did
 very well,  was [place] with therapy up to 160 feet with no pain, swelling or tenderness
.  He finished a full 6-week course of daptomycin.  Consultation was obtained with Dr. Khari Mack 
who felt like he should be discharged home on penicillin 500 mg twice daily indefinitely and this was
 done.  He had no problems with his previous history of recurrent GI bleed and was on Protonix and wi
ll be discharged home on his previous medication of Dexilant.  He was not anticoagulated for his paro
xysmal atrial fibrillation and for his previous history of recurrent deep venous thrombosis because o
f the history of severe GI bleed requiring partial gastrectomy and only continued on his aspirin and 
Plavix.  He did have some problems with some recurrent benign prostatic hypertrophy and lower tract o
bstruction despite treatment with tamsulosin and finasteride, but this was switched tamsulosin and du
tasteride and he improved on this and was able to have the Hudson catheter removed with no difficulty 
during the last several weeks of his hospitalization.  He also have some problems with depression and
 anxiety because of his long illness was started on Citalopram and did well over the last 2 weeks and
 will be discharged home on this.  He required minimal pain medications and pain therapy will be disc
ussed with he and his wife prior to discharge.  He also had some problems with hypoxemia requiring na
sal cannula initially, but this resolved and he was maintained only on his nocturnal CPAP and was beckie
y compliant to this and resting well.  

 

On discharge, his vital signs showed him to have a blood pressure 131/72, temperature is 96, pulse 61
, respirations 20, O2 saturation was 95% on room air.  His lungs showed decreased breath sounds in th
e bases.  Cardiac examination showed irregular rhythm.  Abdomen was obese, nontender.  Left knee show
ed some swelling, but no erythema, warmth, and minimal tenderness.  There was no swelling of the calf
.

 

His discharge laboratory showed him to have white count 10,700.  Sed rate of 31 and hematocrit 39, he
moglobin 12, sodium was 138, potassium 4.1, chloride 107, bicarbonate 22, BUN 18, creatinine 1.01, gl
ucose 125.  CRP was 1.55.  

 

He was discharged home on the above-mentioned citalopram 10 mg daily, Plavix 75 daily, Dexilant 60 da
tana, Avodart 0.5 daily, tamsulosin 0.4 daily, Januvia 50 mg daily that he had taken long-term for his
 diabetes type 2, Crestor 20 mg daily, Nystatin powder as needed, levothyroxine 75 mcg daily and janki
lly rosuvastatin 20 mg nightly.  

 

He will follow up with myself in 2 weeks and Dr. Michael Wall in 2 weeks.  We will continue home heal
 with [facility] with outpatient physical therapy.  He will also call to make an appointment
 to follow up with his pulmonologist, Dr. Rodriguez, in the distant future as well as his urologist, Dr. SHAHRZAD Newell.  He is walking with a walker at this time, no orthopedic limitations and is on a diabe
tic diet and will monitor his Accu-Cheks at home.  He will do his maintenance  care examination with 
myself in 2 weeks.

## 2018-03-21 ENCOUNTER — HOSPITAL ENCOUNTER (EMERGENCY)
Dept: HOSPITAL 92 - ERS | Age: 83
Discharge: HOME | End: 2018-03-21
Payer: MEDICARE

## 2018-03-21 DIAGNOSIS — E11.9: ICD-10-CM

## 2018-03-21 DIAGNOSIS — Z79.82: ICD-10-CM

## 2018-03-21 DIAGNOSIS — E78.5: ICD-10-CM

## 2018-03-21 DIAGNOSIS — I48.91: ICD-10-CM

## 2018-03-21 DIAGNOSIS — Z79.899: ICD-10-CM

## 2018-03-21 DIAGNOSIS — Z86.718: ICD-10-CM

## 2018-03-21 DIAGNOSIS — K57.92: Primary | ICD-10-CM

## 2018-03-21 DIAGNOSIS — I10: ICD-10-CM

## 2018-03-21 DIAGNOSIS — Z86.711: ICD-10-CM

## 2018-03-21 LAB
ALBUMIN SERPL BCG-MCNC: 3.6 G/DL (ref 3.4–4.8)
ALP SERPL-CCNC: 112 U/L (ref 40–150)
ALT SERPL W P-5'-P-CCNC: 15 U/L (ref 8–55)
ANION GAP SERPL CALC-SCNC: 13 MMOL/L (ref 10–20)
AST SERPL-CCNC: 22 U/L (ref 5–34)
BASOPHILS # BLD AUTO: 0.1 THOU/UL (ref 0–0.2)
BASOPHILS NFR BLD AUTO: 0.5 % (ref 0–1)
BILIRUB SERPL-MCNC: 0.5 MG/DL (ref 0.2–1.2)
BUN SERPL-MCNC: 30 MG/DL (ref 8.4–25.7)
CALCIUM SERPL-MCNC: 9.1 MG/DL (ref 7.8–10.44)
CHLORIDE SERPL-SCNC: 111 MMOL/L (ref 98–107)
CO2 SERPL-SCNC: 19 MMOL/L (ref 23–31)
CREAT CL PREDICTED SERPL C-G-VRATE: 0 ML/MIN (ref 70–130)
EOSINOPHIL # BLD AUTO: 0.6 THOU/UL (ref 0–0.7)
EOSINOPHIL NFR BLD AUTO: 4.9 % (ref 0–10)
GLOBULIN SER CALC-MCNC: 3.8 G/DL (ref 2.4–3.5)
GLUCOSE SERPL-MCNC: 131 MG/DL (ref 83–110)
HGB BLD-MCNC: 12.6 G/DL (ref 14–18)
LIPASE SERPL-CCNC: 95 U/L (ref 8–78)
LYMPHOCYTES # BLD: 3.6 THOU/UL (ref 1.2–3.4)
LYMPHOCYTES NFR BLD AUTO: 28.1 % (ref 21–51)
MCH RBC QN AUTO: 28.8 PG (ref 27–31)
MCV RBC AUTO: 90.8 FL (ref 80–94)
MONOCYTES # BLD AUTO: 1.3 THOU/UL (ref 0.11–0.59)
MONOCYTES NFR BLD AUTO: 10.6 % (ref 0–10)
NEUTROPHILS # BLD AUTO: 7.1 THOU/UL (ref 1.4–6.5)
NEUTROPHILS NFR BLD AUTO: 55.8 % (ref 42–75)
PLATELET # BLD AUTO: 136 THOU/UL (ref 130–400)
POTASSIUM SERPL-SCNC: 4.9 MMOL/L (ref 3.5–5.1)
RBC # BLD AUTO: 4.4 MILL/UL (ref 4.7–6.1)
SODIUM SERPL-SCNC: 138 MMOL/L (ref 136–145)
WBC # BLD AUTO: 12.6 THOU/UL (ref 4.8–10.8)

## 2018-03-21 PROCEDURE — 83690 ASSAY OF LIPASE: CPT

## 2018-03-21 PROCEDURE — 36415 COLL VENOUS BLD VENIPUNCTURE: CPT

## 2018-03-21 PROCEDURE — 99284 EMERGENCY DEPT VISIT MOD MDM: CPT

## 2018-03-21 PROCEDURE — 85025 COMPLETE CBC W/AUTO DIFF WBC: CPT

## 2018-03-21 PROCEDURE — 80053 COMPREHEN METABOLIC PANEL: CPT

## 2018-04-05 ENCOUNTER — HOSPITAL ENCOUNTER (OUTPATIENT)
Dept: HOSPITAL 18 - NAV ULT | Age: 83
Discharge: HOME | End: 2018-04-05
Attending: INTERNAL MEDICINE
Payer: MEDICARE

## 2018-04-05 DIAGNOSIS — I82.90: Primary | ICD-10-CM

## 2018-04-25 ENCOUNTER — HOSPITAL ENCOUNTER (OUTPATIENT)
Dept: HOSPITAL 92 - RAD | Age: 83
Discharge: HOME | End: 2018-04-25
Attending: INTERNAL MEDICINE
Payer: MEDICARE

## 2018-04-25 DIAGNOSIS — J98.11: ICD-10-CM

## 2018-04-25 DIAGNOSIS — R06.00: Primary | ICD-10-CM

## 2018-04-25 DIAGNOSIS — I51.7: ICD-10-CM

## 2018-04-25 PROCEDURE — 71046 X-RAY EXAM CHEST 2 VIEWS: CPT

## 2018-04-25 NOTE — RAD
PA AND LATERAL CHEST X-RAY:

 

04/25/2018

 

HISTORY:

Dyspnea.

 

COMPARISON:

01/30/2018

 

FINDINGS:

Dual-lead left subclavian cardiac pacemaker device remains in place.  The right-sided PICC line has b
een removed.  The cardiac silhouette is enlarged.  The pulmonary vasculature is within normal limits.
  There is mild atelectasis at the left lung base.  The lungs are otherwise clear.  Vascular calcific
ation can be seen in the thoracic aorta.  There is right convex curvature of the thoracic spine with 
degenerative changes noted.

 

IMPRESSION:

1.  Mild cardiomegaly without overt congestive heart failure.

2.  Bibasilar atelectasis.

3.  No acute cardiopulmonary process.

 

POS: CET

## 2018-07-28 ENCOUNTER — HOSPITAL ENCOUNTER (EMERGENCY)
Dept: HOSPITAL 92 - ERS | Age: 83
Discharge: HOME | End: 2018-07-28
Payer: MEDICARE

## 2018-07-28 DIAGNOSIS — E11.9: ICD-10-CM

## 2018-07-28 DIAGNOSIS — E78.5: ICD-10-CM

## 2018-07-28 DIAGNOSIS — Z79.82: ICD-10-CM

## 2018-07-28 DIAGNOSIS — N39.0: Primary | ICD-10-CM

## 2018-07-28 DIAGNOSIS — I48.91: ICD-10-CM

## 2018-07-28 DIAGNOSIS — Z79.899: ICD-10-CM

## 2018-07-28 DIAGNOSIS — I10: ICD-10-CM

## 2018-07-28 LAB
ALBUMIN SERPL BCG-MCNC: 3.7 G/DL (ref 3.4–4.8)
ALP SERPL-CCNC: 107 U/L (ref 40–150)
ALT SERPL W P-5'-P-CCNC: 12 U/L (ref 8–55)
ANION GAP SERPL CALC-SCNC: 14 MMOL/L (ref 10–20)
AST SERPL-CCNC: 22 U/L (ref 5–34)
BASOPHILS # BLD AUTO: 0 THOU/UL (ref 0–0.2)
BASOPHILS NFR BLD AUTO: 0.3 % (ref 0–1)
BILIRUB SERPL-MCNC: 0.5 MG/DL (ref 0.2–1.2)
BUN SERPL-MCNC: 29 MG/DL (ref 8.4–25.7)
CALCIUM SERPL-MCNC: 8.8 MG/DL (ref 7.8–10.44)
CHLORIDE SERPL-SCNC: 106 MMOL/L (ref 98–107)
CO2 SERPL-SCNC: 21 MMOL/L (ref 23–31)
CREAT CL PREDICTED SERPL C-G-VRATE: 0 ML/MIN (ref 70–130)
CRYSTAL-AUWI FLAG: 0 (ref 0–15)
EOSINOPHIL # BLD AUTO: 0.8 THOU/UL (ref 0–0.7)
EOSINOPHIL NFR BLD AUTO: 7.9 % (ref 0–10)
GLOBULIN SER CALC-MCNC: 3.4 G/DL (ref 2.4–3.5)
GLUCOSE SERPL-MCNC: 165 MG/DL (ref 83–110)
HEV IGM SER QL: 0 (ref 0–7.99)
HGB BLD-MCNC: 12.7 G/DL (ref 14–18)
HYALINE CASTS #/AREA URNS LPF: (no result) LPF
LYMPHOCYTES # BLD: 2.1 THOU/UL (ref 1.2–3.4)
LYMPHOCYTES NFR BLD AUTO: 21 % (ref 21–51)
MCH RBC QN AUTO: 29 PG (ref 27–31)
MCV RBC AUTO: 88.3 FL (ref 78–98)
MONOCYTES # BLD AUTO: 1.1 THOU/UL (ref 0.11–0.59)
MONOCYTES NFR BLD AUTO: 11 % (ref 0–10)
NEUTROPHILS # BLD AUTO: 5.8 THOU/UL (ref 1.4–6.5)
NEUTROPHILS NFR BLD AUTO: 59.7 % (ref 42–75)
PATHC CAST-AUWI FLAG: 0.14 (ref 0–2.49)
PLATELET # BLD AUTO: 110 THOU/UL (ref 130–400)
POTASSIUM SERPL-SCNC: 4.4 MMOL/L (ref 3.5–5.1)
RBC # BLD AUTO: 4.37 MILL/UL (ref 4.7–6.1)
RBC UR QL AUTO: (no result) HPF (ref 0–3)
SODIUM SERPL-SCNC: 137 MMOL/L (ref 136–145)
SP GR UR STRIP: 1.01 (ref 1–1.04)
SPERM-AUWI FLAG: 0 (ref 0–9.9)
WBC # BLD AUTO: 9.7 THOU/UL (ref 4.8–10.8)
YEAST FLD HPF-#/AREA: (no result) HPF
YEAST-AUWI FLAG: 32.1 (ref 0–25)

## 2018-07-28 PROCEDURE — 96365 THER/PROPH/DIAG IV INF INIT: CPT

## 2018-07-28 PROCEDURE — 80053 COMPREHEN METABOLIC PANEL: CPT

## 2018-07-28 PROCEDURE — 85025 COMPLETE CBC W/AUTO DIFF WBC: CPT

## 2018-07-28 PROCEDURE — 87086 URINE CULTURE/COLONY COUNT: CPT

## 2018-07-28 PROCEDURE — 83605 ASSAY OF LACTIC ACID: CPT

## 2018-07-28 PROCEDURE — 81003 URINALYSIS AUTO W/O SCOPE: CPT

## 2018-07-28 PROCEDURE — 81015 MICROSCOPIC EXAM OF URINE: CPT

## 2018-10-11 ENCOUNTER — HOSPITAL ENCOUNTER (OUTPATIENT)
Dept: HOSPITAL 92 - BICCT | Age: 83
Discharge: HOME | End: 2018-10-11
Attending: SPECIALIST
Payer: MEDICARE

## 2018-10-11 DIAGNOSIS — J32.9: Primary | ICD-10-CM

## 2018-10-11 DIAGNOSIS — Z98.890: ICD-10-CM

## 2018-10-11 NOTE — CT
CT PARANASAL SINUSES NONCONTRAST:

10/11/18

 

HISTORY: 

Chronic sinusitis. 

 

FINDINGS:  

Postoperative changes of the medial wall of each maxillary sinus with apparent antrectomies. Severe m
ucosal thickening throughout all of the paranasal sinuses, nearly completely opacifying the ethmoid a
ir cells and maxillary sinuses and greater than half of the sphenoid and frontal sinus volume capacit
y. No air fluid levels are apparent. Ethmoid septae absence may be surgical or erosive. Nasal septum 
is intact. Calcification within the arterial structures.

 

IMPRESSION:  

Severe paranasal pan sinusitis with postsurgical removal or erosion of the ethmoid infundibula and et
hmoid septae. No air fluid levels are apparent. 

 

Atherosclerosis. 

 

POS: PEGGY

## 2019-11-11 ENCOUNTER — HOSPITAL ENCOUNTER (OUTPATIENT)
Dept: HOSPITAL 92 - SDC | Age: 84
Discharge: HOME | End: 2019-11-11
Attending: UROLOGY
Payer: MEDICARE

## 2019-11-11 VITALS — BODY MASS INDEX: 34.9 KG/M2

## 2019-11-11 DIAGNOSIS — N20.1: Primary | ICD-10-CM

## 2019-11-11 DIAGNOSIS — Z79.2: ICD-10-CM

## 2019-11-11 DIAGNOSIS — N40.0: ICD-10-CM

## 2019-11-11 DIAGNOSIS — G47.30: ICD-10-CM

## 2019-11-11 DIAGNOSIS — B96.20: ICD-10-CM

## 2019-11-11 DIAGNOSIS — Z79.84: ICD-10-CM

## 2019-11-11 DIAGNOSIS — K21.9: ICD-10-CM

## 2019-11-11 DIAGNOSIS — N39.0: ICD-10-CM

## 2019-11-11 DIAGNOSIS — Z79.899: ICD-10-CM

## 2019-11-11 DIAGNOSIS — E11.9: ICD-10-CM

## 2019-11-11 DIAGNOSIS — Z87.440: ICD-10-CM

## 2019-11-11 DIAGNOSIS — N32.89: ICD-10-CM

## 2019-11-11 DIAGNOSIS — Z79.82: ICD-10-CM

## 2019-11-11 LAB
ANION GAP SERPL CALC-SCNC: 12 MMOL/L (ref 10–20)
APTT PPP: 33.4 SEC (ref 22.9–36.1)
BASOPHILS # BLD AUTO: 0 THOU/UL (ref 0–0.2)
BASOPHILS NFR BLD AUTO: 0.5 % (ref 0–1)
BUN SERPL-MCNC: 36 MG/DL (ref 8.4–25.7)
CALCIUM SERPL-MCNC: 9.1 MG/DL (ref 7.8–10.44)
CHLORIDE SERPL-SCNC: 109 MMOL/L (ref 98–107)
CO2 SERPL-SCNC: 23 MMOL/L (ref 23–31)
CREAT CL PREDICTED SERPL C-G-VRATE: 55 ML/MIN (ref 70–130)
EOSINOPHIL # BLD AUTO: 0.6 THOU/UL (ref 0–0.7)
EOSINOPHIL NFR BLD AUTO: 6.4 % (ref 0–10)
GLUCOSE SERPL-MCNC: 105 MG/DL (ref 83–110)
HGB BLD-MCNC: 12.6 G/DL (ref 14–18)
INR PPP: 1.1
LYMPHOCYTES # BLD: 2.9 THOU/UL (ref 1.2–3.4)
LYMPHOCYTES NFR BLD AUTO: 31.4 % (ref 21–51)
MCH RBC QN AUTO: 29.7 PG (ref 27–31)
MCV RBC AUTO: 89.6 FL (ref 78–98)
MONOCYTES # BLD AUTO: 0.8 THOU/UL (ref 0.11–0.59)
MONOCYTES NFR BLD AUTO: 8.8 % (ref 0–10)
NEUTROPHILS # BLD AUTO: 4.9 THOU/UL (ref 1.4–6.5)
NEUTROPHILS NFR BLD AUTO: 53 % (ref 42–75)
PLATELET # BLD AUTO: 115 THOU/UL (ref 130–400)
POTASSIUM SERPL-SCNC: 4.6 MMOL/L (ref 3.5–5.1)
PROTHROMBIN TIME: 14.2 SEC (ref 12–14.7)
RBC # BLD AUTO: 4.25 MILL/UL (ref 4.7–6.1)
SODIUM SERPL-SCNC: 139 MMOL/L (ref 136–145)
WBC # BLD AUTO: 9.3 THOU/UL (ref 4.8–10.8)

## 2019-11-11 PROCEDURE — 80048 BASIC METABOLIC PNL TOTAL CA: CPT

## 2019-11-11 PROCEDURE — BT1F1ZZ FLUOROSCOPY OF LEFT KIDNEY, URETER AND BLADDER USING LOW OSMOLAR CONTRAST: ICD-10-PCS | Performed by: UROLOGY

## 2019-11-11 PROCEDURE — C1758 CATHETER, URETERAL: HCPCS

## 2019-11-11 PROCEDURE — 85610 PROTHROMBIN TIME: CPT

## 2019-11-11 PROCEDURE — 52356 CYSTO/URETERO W/LITHOTRIPSY: CPT

## 2019-11-11 PROCEDURE — 85730 THROMBOPLASTIN TIME PARTIAL: CPT

## 2019-11-11 PROCEDURE — 51798 US URINE CAPACITY MEASURE: CPT

## 2019-11-11 PROCEDURE — 0TC78ZZ EXTIRPATION OF MATTER FROM LEFT URETER, VIA NATURAL OR ARTIFICIAL OPENING ENDOSCOPIC: ICD-10-PCS | Performed by: UROLOGY

## 2019-11-11 PROCEDURE — 0T778DZ DILATION OF LEFT URETER WITH INTRALUMINAL DEVICE, VIA NATURAL OR ARTIFICIAL OPENING ENDOSCOPIC: ICD-10-PCS | Performed by: UROLOGY

## 2019-11-11 PROCEDURE — 88300 SURGICAL PATH GROSS: CPT

## 2019-11-11 PROCEDURE — 82365 CALCULUS SPECTROSCOPY: CPT

## 2019-11-11 PROCEDURE — 85025 COMPLETE CBC W/AUTO DIFF WBC: CPT

## 2019-11-11 PROCEDURE — 74420 UROGRAPHY RTRGR +-KUB: CPT

## 2019-11-11 PROCEDURE — 36415 COLL VENOUS BLD VENIPUNCTURE: CPT

## 2019-11-11 NOTE — RAD
Retrograde pyelogram:

11/11/2019



COMPARISON: None available



HISTORY: Left-sided ureteral stent



FINDINGS: Imaged 4 out of 11 demonstrates contrast media within the left ureter and left renal collec
ting system. There is a questionable filling defect within the distal left ureter on image 4 of 11,

which may represent a stone within the distal left ureter. Later imaging demonstrates placement of a 
double-J ureteral stent. There is mild irregularity of the distal left ureter on image 4 of 11 at

the level of the pelvic inlet, incompletely assessed on this exam.



IMPRESSION: Retrograde pyelogram as above.



Reported By: Umesh Ramirez 

Electronically Signed:  11/11/2019 11:19 AM

## 2019-11-11 NOTE — OP
DATE OF PROCEDURE:  11/11/2019



PREOPERATIVE DIAGNOSES:  

1. Left distal ureteral stones.

2. Recurrent urinary tract infections.



POSTOPERATIVE DIAGNOSES:  

1. Left distal ureteral stones.

2. Recurrent urinary tract infections.



PROCEDURES PERFORMED:  

1. Cystoscopy.

2. Left retrograde.

3. Left ureteroscopy.

4. Laser lithotripsy.

5. Stone retrieval.

6. Left stent placement.



ANESTHETIC:  General.



ESTIMATED BLOOD LOSS:  Minimal.



FINDINGS:  There were 2 left distal ureteral stones, broke up a number of small

pieces, which were basketed out, dropped in about bladder, then Ellik'd out of the

bladder and sent for stone analysis.  Then, we placed a 6-Jordanian x 24 cm double-J

stent with a string attached to it. 



DESCRIPTION OF PROCEDURE:  Obtained written and verbal consent from the patient.

After receiving IV antibiotics, he was taken to the operating suite.  He was placed

in a supine position on the treatment table.  PlexiPulses were placed on his lower

extremities and turned on.  He was given a general anesthetic and oral obturator

intubation.  He was placed in the dorsal lithotomy position and was sterilely

prepped and draped for cystoscopy.  C-arm unit was brought in.  Cystoscopy was

performed with a 22-Jordanian sheath.  This was passed, well lubricated under direct

vision through the male urethra into the urinary bladder with aid of a 30-degree

lens of video camera and monitor.  There was no evidence of urethral strictures.

There was large trilobar BPH, 1+ trabeculation.  No stones in the bladder.  No

tumors.  The bladder was filled and emptied number of times with the findings above.

 A 5-Jordanian Pollack catheter was flushed with contrast and brought in and placed in

the left ureteral orifice.  As we did fluoroscopy, we slowly injected about 12 mL of

contrast filling up this ureter.  There were 2 filling defects in the distal ureter.

 Nothing else abnormal.  We fed a guidewire up the side, removed the instruments,

brought in a small caliber rigid ureteroscope, passed under direct vision with aid

of a video camera across the male urethra into the bladder and up the left ureter

until the stones were visualized.  We then brought in a small caliber holmium laser

fiber, used this to break up the stones into numerous pieces and then used a Nitinol

basket to remove these pieces and brought them into the bladder.  Once this was

completed, there was nothing but just dust left.  We removed the ureteroscope,

leaving the guidewire in place.  We back-loaded the guidewire through the cystoscope

and replaced that, placed a 5-Jordanian Pollack catheter up in the renal pelvis,

removed the guidewire, injected about 10 mL to 15 mL of contrast, filling up the

entire collecting system.  There was no significant filling defect.  There was no

hydro.  There was no extravasation of contrast.  The guidewire was replaced.  The

open-ended catheter was removed.  A stent was placed over the guidewire, pushed up

in place with aid of a pusher, so its proximal end coiled in the renal pelvis and

its distal end coiled in the bladder when the wire was removed.  We then removed the

scope, leaving the string attached to the stent and cut the string shorter and then

replaced the scope and then used the Sefaira evacuator to evacuate the stone fragments

out of the bladder and sending them off for stone analysis.  At this point, the

patient was awakened, extubated, taken by stretcher to recovery room. 







Job ID:  [id number]

## 2019-11-15 LAB
AMM URATE MFR STONE: (no result) %
BLD.DRIED MFR STONE: (no result) %
CA BILIRUB MFR STONE: (no result) %
CA CARBONATE MFR STONE: (no result) %
CA H2 PHOS DIHYD MFR STONE: (no result) %
CA PHOS MFR STONE: 3 %
CALCIUM OXALATE DIHYDRATE MFR STONE IR: (no result) %
CELL MATERIAL STONE EST-MCNT: (no result) %
CHOLEST MFR STONE: (no result) %
COM MFR STONE: 97 %
COMMENT: (no result)
CYSTINE MFR STONE: (no result) %
LABORATORY COMMENT REPORT: (no result)
NA URATE MFR STONE IR: (no result) %
NEWBERYITE MFR STONE: (no result) %
TRI-PHOS MFR STONE: (no result) %
TRIAMTERENE MFR STONE: (no result) %
URATE DIHYD MFR STONE: (no result) %
URATE MFR STONE: (no result) %
WT STONE: 90.5 MG

## 2020-01-16 ENCOUNTER — HOSPITAL ENCOUNTER (OUTPATIENT)
Dept: HOSPITAL 92 - SLEEPLAB | Age: 85
Discharge: HOME | End: 2020-01-16
Attending: INTERNAL MEDICINE
Payer: MEDICARE

## 2020-01-16 DIAGNOSIS — E66.9: ICD-10-CM

## 2020-01-16 DIAGNOSIS — G47.33: Primary | ICD-10-CM

## 2020-01-16 DIAGNOSIS — G47.31: ICD-10-CM

## 2020-01-16 DIAGNOSIS — J44.9: ICD-10-CM

## 2020-01-16 PROCEDURE — 95810 POLYSOM 6/> YRS 4/> PARAM: CPT

## 2020-07-02 ENCOUNTER — HOSPITAL ENCOUNTER (EMERGENCY)
Dept: HOSPITAL 92 - ERS | Age: 85
Discharge: HOME | End: 2020-07-02
Payer: MEDICARE

## 2020-07-02 DIAGNOSIS — R06.02: Primary | ICD-10-CM

## 2020-07-02 DIAGNOSIS — I10: ICD-10-CM

## 2020-07-02 DIAGNOSIS — E11.9: ICD-10-CM

## 2020-07-02 DIAGNOSIS — R09.81: ICD-10-CM

## 2020-07-02 DIAGNOSIS — Z79.899: ICD-10-CM

## 2020-07-02 LAB
ALBUMIN SERPL BCG-MCNC: 3.7 G/DL (ref 3.4–4.8)
ALP SERPL-CCNC: 226 U/L (ref 40–110)
ALT SERPL W P-5'-P-CCNC: 110 U/L (ref 8–55)
ANION GAP SERPL CALC-SCNC: 15 MMOL/L (ref 10–20)
AST SERPL-CCNC: 76 U/L (ref 5–34)
BASOPHILS # BLD AUTO: 0 THOU/UL (ref 0–0.2)
BASOPHILS NFR BLD AUTO: 0.5 % (ref 0–1)
BILIRUB SERPL-MCNC: 0.5 MG/DL (ref 0.2–1.2)
BUN SERPL-MCNC: 40 MG/DL (ref 8.4–25.7)
CALCIUM SERPL-MCNC: 9.2 MG/DL (ref 7.8–10.44)
CHLORIDE SERPL-SCNC: 104 MMOL/L (ref 98–107)
CK MB SERPL-MCNC: 1.9 NG/ML (ref 0–6.6)
CK SERPL-CCNC: 44 U/L (ref 30–200)
CO2 SERPL-SCNC: 27 MMOL/L (ref 23–31)
CREAT CL PREDICTED SERPL C-G-VRATE: 0 ML/MIN (ref 70–130)
EOSINOPHIL # BLD AUTO: 0.5 THOU/UL (ref 0–0.7)
EOSINOPHIL NFR BLD AUTO: 5.7 % (ref 0–10)
GLOBULIN SER CALC-MCNC: 3.2 G/DL (ref 2.4–3.5)
GLUCOSE SERPL-MCNC: 146 MG/DL (ref 83–110)
HGB BLD-MCNC: 12.9 G/DL (ref 14–18)
LYMPHOCYTES # BLD: 2.7 THOU/UL (ref 1.2–3.4)
LYMPHOCYTES NFR BLD AUTO: 28.4 % (ref 21–51)
MCH RBC QN AUTO: 30.1 PG (ref 27–31)
MCV RBC AUTO: 93.2 FL (ref 78–98)
MONOCYTES # BLD AUTO: 1.1 THOU/UL (ref 0.11–0.59)
MONOCYTES NFR BLD AUTO: 11 % (ref 0–10)
NEUTROPHILS # BLD AUTO: 5.2 THOU/UL (ref 1.4–6.5)
NEUTROPHILS NFR BLD AUTO: 54.5 % (ref 42–75)
PLATELET # BLD AUTO: 139 THOU/UL (ref 130–400)
POTASSIUM SERPL-SCNC: 4.7 MMOL/L (ref 3.5–5.1)
RBC # BLD AUTO: 4.29 MILL/UL (ref 4.7–6.1)
SODIUM SERPL-SCNC: 141 MMOL/L (ref 136–145)
WBC # BLD AUTO: 9.6 THOU/UL (ref 4.8–10.8)

## 2020-07-02 PROCEDURE — 82550 ASSAY OF CK (CPK): CPT

## 2020-07-02 PROCEDURE — 82553 CREATINE MB FRACTION: CPT

## 2020-07-02 PROCEDURE — 85025 COMPLETE CBC W/AUTO DIFF WBC: CPT

## 2020-07-02 PROCEDURE — 84484 ASSAY OF TROPONIN QUANT: CPT

## 2020-07-02 PROCEDURE — 93005 ELECTROCARDIOGRAM TRACING: CPT

## 2020-07-02 PROCEDURE — 83880 ASSAY OF NATRIURETIC PEPTIDE: CPT

## 2020-07-02 PROCEDURE — 36415 COLL VENOUS BLD VENIPUNCTURE: CPT

## 2020-07-02 PROCEDURE — 71045 X-RAY EXAM CHEST 1 VIEW: CPT

## 2020-07-02 PROCEDURE — 80053 COMPREHEN METABOLIC PANEL: CPT

## 2020-07-02 NOTE — RAD
XR Chest 1 View



History: Shortness of breath



Comparison: Radiograph 2018



Findings: Heart size is enlarged. There is pulmonary venous congestion. There is a 3-lead AICD/pacer.
 No acute osseous abnormality.



Impression: Cardiomegaly and mild pulmonary venous congestion.



Reported By: Josh Keane 

Electronically Signed:  7/2/2020 9:47 PM

## 2020-07-16 ENCOUNTER — HOSPITAL ENCOUNTER (OUTPATIENT)
Dept: HOSPITAL 92 - SLEEPLAB | Age: 85
Discharge: HOME | End: 2020-07-16
Attending: INTERNAL MEDICINE
Payer: MEDICARE

## 2020-07-16 DIAGNOSIS — E66.9: ICD-10-CM

## 2020-07-16 DIAGNOSIS — G47.33: Primary | ICD-10-CM

## 2020-07-16 DIAGNOSIS — J44.9: ICD-10-CM

## 2020-07-16 PROCEDURE — 95811 POLYSOM 6/>YRS CPAP 4/> PARM: CPT

## 2020-10-28 NOTE — ULT
DOPPLER VENOUS ULTRASOUND OF THE LEFT LOWER EXTREMITY:

 

INDICATIONS:

Left lower extremity edema for three weeks, following knee surgery in December 2017.

 

TECHNIQUE:

Gray scale, color Doppler, and vascular duplex with spectral analysis was performed of the deep venou
s structures of the left lower extremity. The common femoral vein, superficial femoral vein, poplitea
l vein, posterior tibial vein, proximal greater saphenous, and proximal profunda veins were assessed 
bilaterally.

 

FINDINGS:

There is normal compression, flow, and augmentation seen within the deep venous structures of the lef
t lower extremity.

 

IMPRESSION:

No evidence of deep venous thrombosis in the left lower extremity.

 

POS: TIFFANIE Discharge instructions reviewed with patient and his wife Katherin, both verbalized understanding of instructions and denied having any questions.  Patient sent home with personal belongings and discharge instructions.  Will be driven home by his wife.